# Patient Record
Sex: MALE | Race: WHITE | NOT HISPANIC OR LATINO | Employment: OTHER | URBAN - METROPOLITAN AREA
[De-identification: names, ages, dates, MRNs, and addresses within clinical notes are randomized per-mention and may not be internally consistent; named-entity substitution may affect disease eponyms.]

---

## 2017-09-21 ENCOUNTER — HISTORICAL (OUTPATIENT)
Dept: CARDIOLOGY | Facility: HOSPITAL | Age: 67
End: 2017-09-21

## 2018-03-27 ENCOUNTER — HISTORICAL (OUTPATIENT)
Dept: ADMINISTRATIVE | Facility: HOSPITAL | Age: 68
End: 2018-03-27

## 2018-03-27 LAB
ALBUMIN SERPL-MCNC: 3.6 GM/DL (ref 3.4–5)
ALBUMIN/GLOB SERPL: 1 RATIO (ref 1.1–2)
ALP SERPL-CCNC: 39 UNIT/L (ref 50–136)
ALT SERPL-CCNC: 33 UNIT/L (ref 12–78)
AST SERPL-CCNC: 27 UNIT/L (ref 15–37)
BILIRUB SERPL-MCNC: 0.6 MG/DL (ref 0.2–1)
BILIRUBIN DIRECT+TOT PNL SERPL-MCNC: 0.2 MG/DL (ref 0–0.5)
BILIRUBIN DIRECT+TOT PNL SERPL-MCNC: 0.4 MG/DL (ref 0–0.8)
BUN SERPL-MCNC: 12 MG/DL (ref 7–18)
CALCIUM SERPL-MCNC: 9.1 MG/DL (ref 8.5–10.1)
CHLORIDE SERPL-SCNC: 106 MMOL/L (ref 98–107)
CHOLEST SERPL-MCNC: 160 MG/DL (ref 0–200)
CHOLEST/HDLC SERPL: 3.1 {RATIO} (ref 0–5)
CO2 SERPL-SCNC: 26 MMOL/L (ref 21–32)
CREAT SERPL-MCNC: 0.62 MG/DL (ref 0.7–1.3)
ERYTHROCYTE [DISTWIDTH] IN BLOOD BY AUTOMATED COUNT: 13.2 % (ref 11.5–17)
GLOBULIN SER-MCNC: 3.6 GM/DL (ref 2.4–3.5)
GLUCOSE SERPL-MCNC: 78 MG/DL (ref 74–106)
HAV IGM SERPL QL IA: NEGATIVE
HBV CORE IGM SERPL QL IA: NEGATIVE
HBV SURFACE AG SERPL QL IA: NEGATIVE
HCT VFR BLD AUTO: 44.9 % (ref 42–52)
HCV AB SERPL QL IA: NEGATIVE
HDLC SERPL-MCNC: 51 MG/DL (ref 35–60)
HEPATITIS PANEL INTERP: NORMAL
HGB BLD-MCNC: 15.1 GM/DL (ref 14–18)
LDLC SERPL CALC-MCNC: 80 MG/DL (ref 0–129)
MCH RBC QN AUTO: 31.6 PG (ref 27–31)
MCHC RBC AUTO-ENTMCNC: 33.6 GM/DL (ref 33–36)
MCV RBC AUTO: 93.9 FL (ref 80–94)
PLATELET # BLD AUTO: 138 X10(3)/MCL (ref 130–400)
PMV BLD AUTO: 9.7 FL (ref 9.4–12.4)
POTASSIUM SERPL-SCNC: 4.2 MMOL/L (ref 3.5–5.1)
PROT SERPL-MCNC: 7.2 GM/DL (ref 6.4–8.2)
PSA SERPL-MCNC: 0.58 NG/ML (ref 0–4)
RBC # BLD AUTO: 4.78 X10(6)/MCL (ref 4.7–6.1)
SODIUM SERPL-SCNC: 141 MMOL/L (ref 136–145)
TRIGL SERPL-MCNC: 144 MG/DL (ref 30–150)
TSH SERPL-ACNC: 1.31 MIU/ML (ref 0.36–3.74)
VLDLC SERPL CALC-MCNC: 29 MG/DL
WBC # SPEC AUTO: 5.8 X10(3)/MCL (ref 4.5–11.5)

## 2019-02-12 ENCOUNTER — HISTORICAL (OUTPATIENT)
Dept: ADMINISTRATIVE | Facility: HOSPITAL | Age: 69
End: 2019-02-12

## 2019-09-10 ENCOUNTER — HISTORICAL (OUTPATIENT)
Dept: ADMINISTRATIVE | Facility: HOSPITAL | Age: 69
End: 2019-09-10

## 2019-09-10 LAB
ALBUMIN SERPL-MCNC: 3.8 GM/DL (ref 3.4–5)
ALBUMIN/GLOB SERPL: 1 RATIO (ref 1.1–2)
ALP SERPL-CCNC: 42 UNIT/L (ref 50–136)
ALT SERPL-CCNC: 34 UNIT/L (ref 12–78)
AST SERPL-CCNC: 26 UNIT/L (ref 15–37)
BILIRUB SERPL-MCNC: 0.8 MG/DL (ref 0.2–1)
BILIRUBIN DIRECT+TOT PNL SERPL-MCNC: 0.2 MG/DL (ref 0–0.5)
BILIRUBIN DIRECT+TOT PNL SERPL-MCNC: 0.6 MG/DL (ref 0–0.8)
BUN SERPL-MCNC: 14 MG/DL (ref 7–18)
CALCIUM SERPL-MCNC: 8.9 MG/DL (ref 8.5–10.1)
CHLORIDE SERPL-SCNC: 107 MMOL/L (ref 98–107)
CO2 SERPL-SCNC: 28 MMOL/L (ref 21–32)
CREAT SERPL-MCNC: 0.67 MG/DL (ref 0.7–1.3)
ERYTHROCYTE [DISTWIDTH] IN BLOOD BY AUTOMATED COUNT: 13 % (ref 11.5–17)
EST. AVERAGE GLUCOSE BLD GHB EST-MCNC: 111 MG/DL
GLOBULIN SER-MCNC: 3.7 GM/DL (ref 2.4–3.5)
GLUCOSE SERPL-MCNC: 99 MG/DL (ref 74–106)
HBA1C MFR BLD: 5.5 % (ref 4.2–6.3)
HCT VFR BLD AUTO: 48.2 % (ref 42–52)
HGB BLD-MCNC: 15.7 GM/DL (ref 14–18)
MCH RBC QN AUTO: 31.8 PG (ref 27–31)
MCHC RBC AUTO-ENTMCNC: 32.6 GM/DL (ref 33–36)
MCV RBC AUTO: 97.6 FL (ref 80–94)
PLATELET # BLD AUTO: 187 X10(3)/MCL (ref 130–400)
PMV BLD AUTO: 10.1 FL (ref 9.4–12.4)
POTASSIUM SERPL-SCNC: 4.2 MMOL/L (ref 3.5–5.1)
PROT SERPL-MCNC: 7.5 GM/DL (ref 6.4–8.2)
PSA SERPL-MCNC: 0.81 NG/ML (ref 0–4)
RBC # BLD AUTO: 4.94 X10(6)/MCL (ref 4.7–6.1)
SODIUM SERPL-SCNC: 142 MMOL/L (ref 136–145)
URATE SERPL-MCNC: 4.1 MG/DL (ref 2.6–7.2)
WBC # SPEC AUTO: 7.4 X10(3)/MCL (ref 4.5–11.5)

## 2020-01-27 ENCOUNTER — HISTORICAL (OUTPATIENT)
Dept: PREADMISSION TESTING | Facility: HOSPITAL | Age: 70
End: 2020-01-27

## 2020-01-27 LAB
ABS NEUT (OLG): 3.42 X10(3)/MCL (ref 2.1–9.2)
BASOPHILS # BLD AUTO: 0 X10(3)/MCL (ref 0–0.2)
BASOPHILS NFR BLD AUTO: 1 %
BUN SERPL-MCNC: 11 MG/DL (ref 7–18)
CALCIUM SERPL-MCNC: 9.4 MG/DL (ref 8.5–10.1)
CHLORIDE SERPL-SCNC: 105 MMOL/L (ref 98–107)
CO2 SERPL-SCNC: 29 MMOL/L (ref 21–32)
CREAT SERPL-MCNC: 0.7 MG/DL (ref 0.7–1.3)
CREAT/UREA NIT SERPL: 15.7
EOSINOPHIL # BLD AUTO: 0.1 X10(3)/MCL (ref 0–0.9)
EOSINOPHIL NFR BLD AUTO: 2 %
ERYTHROCYTE [DISTWIDTH] IN BLOOD BY AUTOMATED COUNT: 12.6 % (ref 11.5–17)
GLUCOSE SERPL-MCNC: 97 MG/DL (ref 74–106)
HCT VFR BLD AUTO: 48 % (ref 42–52)
HGB BLD-MCNC: 15.9 GM/DL (ref 14–18)
LYMPHOCYTES # BLD AUTO: 2 X10(3)/MCL (ref 0.6–4.6)
LYMPHOCYTES NFR BLD AUTO: 32 %
MCH RBC QN AUTO: 32.4 PG (ref 27–31)
MCHC RBC AUTO-ENTMCNC: 33.1 GM/DL (ref 33–36)
MCV RBC AUTO: 97.8 FL (ref 80–94)
MONOCYTES # BLD AUTO: 0.8 X10(3)/MCL (ref 0.1–1.3)
MONOCYTES NFR BLD AUTO: 13 %
NEUTROPHILS # BLD AUTO: 3.42 X10(3)/MCL (ref 2.1–9.2)
NEUTROPHILS NFR BLD AUTO: 53 %
PLATELET # BLD AUTO: 152 X10(3)/MCL (ref 130–400)
PMV BLD AUTO: 10.1 FL (ref 9.4–12.4)
POTASSIUM SERPL-SCNC: 4.4 MMOL/L (ref 3.5–5.1)
RBC # BLD AUTO: 4.91 X10(6)/MCL (ref 4.7–6.1)
SODIUM SERPL-SCNC: 139 MMOL/L (ref 136–145)
WBC # SPEC AUTO: 6.5 X10(3)/MCL (ref 4.5–11.5)

## 2020-02-06 ENCOUNTER — HISTORICAL (OUTPATIENT)
Dept: SURGERY | Facility: HOSPITAL | Age: 70
End: 2020-02-06

## 2021-01-28 ENCOUNTER — HISTORICAL (OUTPATIENT)
Dept: PREADMISSION TESTING | Facility: HOSPITAL | Age: 71
End: 2021-01-28

## 2021-01-28 LAB
ABS NEUT (OLG): 2.92 X10(3)/MCL (ref 2.1–9.2)
BASOPHILS # BLD AUTO: 0.1 X10(3)/MCL (ref 0–0.2)
BASOPHILS NFR BLD AUTO: 1 %
EOSINOPHIL # BLD AUTO: 0.2 X10(3)/MCL (ref 0–0.9)
EOSINOPHIL NFR BLD AUTO: 2 %
ERYTHROCYTE [DISTWIDTH] IN BLOOD BY AUTOMATED COUNT: 13.2 % (ref 11.5–17)
HCT VFR BLD AUTO: 48 % (ref 42–52)
HGB BLD-MCNC: 15.8 GM/DL (ref 14–18)
LYMPHOCYTES # BLD AUTO: 2.4 X10(3)/MCL (ref 0.6–4.6)
LYMPHOCYTES NFR BLD AUTO: 38 %
MCH RBC QN AUTO: 32.4 PG (ref 27–31)
MCHC RBC AUTO-ENTMCNC: 32.9 GM/DL (ref 33–36)
MCV RBC AUTO: 98.6 FL (ref 80–94)
MONOCYTES # BLD AUTO: 0.8 X10(3)/MCL (ref 0.1–1.3)
MONOCYTES NFR BLD AUTO: 12 %
NEUTROPHILS # BLD AUTO: 2.92 X10(3)/MCL (ref 2.1–9.2)
NEUTROPHILS NFR BLD AUTO: 46 %
PLATELET # BLD AUTO: 176 X10(3)/MCL (ref 130–400)
PMV BLD AUTO: 10.4 FL (ref 9.4–12.4)
RBC # BLD AUTO: 4.87 X10(6)/MCL (ref 4.7–6.1)
WBC # SPEC AUTO: 6.3 X10(3)/MCL (ref 4.5–11.5)

## 2021-02-01 ENCOUNTER — HISTORICAL (OUTPATIENT)
Dept: ADMINISTRATIVE | Facility: HOSPITAL | Age: 71
End: 2021-02-01

## 2021-09-10 ENCOUNTER — HISTORICAL (OUTPATIENT)
Dept: LAB | Facility: HOSPITAL | Age: 71
End: 2021-09-10

## 2021-09-10 LAB
ABS NEUT (OLG): 2.01 X10(3)/MCL (ref 2.1–9.2)
ALBUMIN SERPL-MCNC: 3.7 GM/DL (ref 3.4–4.8)
ALBUMIN/GLOB SERPL: 1 RATIO (ref 1.1–2)
ALP SERPL-CCNC: 41 UNIT/L (ref 40–150)
ALT SERPL-CCNC: 41 UNIT/L (ref 0–55)
AST SERPL-CCNC: 35 UNIT/L (ref 5–34)
BASOPHILS # BLD AUTO: 0.06 X10(3)/MCL (ref 0–0.2)
BASOPHILS NFR BLD AUTO: 1.2 % (ref 0–0.9)
BILIRUB SERPL-MCNC: 0.5 MG/DL (ref 0.2–1.2)
BILIRUBIN DIRECT+TOT PNL SERPL-MCNC: 0.2 MG/DL (ref 0–0.5)
BILIRUBIN DIRECT+TOT PNL SERPL-MCNC: 0.3 MG/DL (ref 0–0.8)
BUN SERPL-MCNC: 9.6 MG/DL (ref 8.4–25.7)
CALCIUM SERPL-MCNC: 9.2 MG/DL (ref 8.8–10)
CHLORIDE SERPL-SCNC: 107 MMOL/L (ref 98–107)
CHOLEST SERPL-MCNC: 147 MG/DL
CHOLEST/HDLC SERPL: 3 {RATIO} (ref 0–5)
CO2 SERPL-SCNC: 25 MMOL/L (ref 23–31)
CREAT SERPL-MCNC: 0.69 MG/DL (ref 0.72–1.25)
EOSINOPHIL # BLD AUTO: 0.17 X10(3)/MCL (ref 0–0.9)
EOSINOPHIL NFR BLD AUTO: 3.4 % (ref 0–6.5)
ERYTHROCYTE [DISTWIDTH] IN BLOOD BY AUTOMATED COUNT: 12.9 % (ref 11.5–17)
EST. AVERAGE GLUCOSE BLD GHB EST-MCNC: 108.3 MG/DL
GLOBULIN SER-MCNC: 3.8 GM/DL (ref 2.4–3.5)
GLUCOSE SERPL-MCNC: 107 MG/DL (ref 82–115)
HBA1C MFR BLD: 5.4 %
HCT VFR BLD AUTO: 47.2 % (ref 42–52)
HDLC SERPL-MCNC: 45 MG/DL (ref 40–60)
HGB BLD-MCNC: 15.8 GM/DL (ref 14–18)
IMM GRANULOCYTES # BLD AUTO: 0.01 10*3/UL (ref 0–0.02)
IMM GRANULOCYTES NFR BLD AUTO: 0.2 % (ref 0–0.43)
LDLC SERPL CALC-MCNC: 72 MG/DL (ref 50–140)
LYMPHOCYTES # BLD AUTO: 2.22 X10(3)/MCL (ref 0.6–4.6)
LYMPHOCYTES NFR BLD AUTO: 43.9 % (ref 16.2–38.3)
MCH RBC QN AUTO: 32.6 PG (ref 27–31)
MCHC RBC AUTO-ENTMCNC: 33.5 GM/DL (ref 33–36)
MCV RBC AUTO: 97.5 FL (ref 80–94)
MONOCYTES # BLD AUTO: 0.59 X10(3)/MCL (ref 0.1–1.3)
MONOCYTES NFR BLD AUTO: 11.7 % (ref 4.7–11.3)
NEUTROPHILS # BLD AUTO: 2.01 X10(3)/MCL (ref 2.1–9.2)
NEUTROPHILS NFR BLD AUTO: 39.6 % (ref 49.1–73.4)
NRBC BLD AUTO-RTO: 0 % (ref 0–0.2)
PLATELET # BLD AUTO: 161 X10(3)/MCL (ref 130–400)
PMV BLD AUTO: 10 FL (ref 7.4–10.4)
POTASSIUM SERPL-SCNC: 4.1 MMOL/L (ref 3.5–5.1)
PROT SERPL-MCNC: 7.5 GM/DL (ref 5.8–7.6)
PSA SERPL-MCNC: 0.89 NG/ML
RBC # BLD AUTO: 4.84 X10(6)/MCL (ref 4.7–6.1)
SODIUM SERPL-SCNC: 142 MMOL/L (ref 136–145)
TRIGL SERPL-MCNC: 148 MG/DL (ref 0–150)
URATE SERPL-MCNC: 4 MG/DL (ref 3.5–7.2)
VLDLC SERPL CALC-MCNC: 30 MG/DL
WBC # SPEC AUTO: 5.1 X10(3)/MCL (ref 4.5–11.5)

## 2022-03-08 ENCOUNTER — HISTORICAL (OUTPATIENT)
Dept: LAB | Facility: HOSPITAL | Age: 72
End: 2022-03-08

## 2022-03-08 LAB
ABS NEUT (OLG): 2.86 (ref 2.1–9.2)
ALBUMIN SERPL-MCNC: 3.8 G/DL (ref 3.4–4.8)
ALBUMIN/GLOB SERPL: 1 {RATIO} (ref 1.1–2)
ALP SERPL-CCNC: 44 U/L (ref 40–150)
ALT SERPL-CCNC: 31 U/L (ref 0–55)
AST SERPL-CCNC: 29 U/L (ref 5–34)
BASOPHILS # BLD AUTO: 0.07 10*3/UL (ref 0–0.2)
BASOPHILS NFR BLD AUTO: 1.2 % (ref 0–0.9)
BILIRUB SERPL-MCNC: 0.7 MG/DL (ref 0.2–1.2)
BILIRUBIN DIRECT+TOT PNL SERPL-MCNC: 0.3 (ref 0–0.5)
BILIRUBIN DIRECT+TOT PNL SERPL-MCNC: 0.4 (ref 0–0.8)
BUN SERPL-MCNC: 10.3 MG/DL (ref 8.4–25.7)
CALCIUM SERPL-MCNC: 9.3 MG/DL (ref 8.8–10)
CHLORIDE SERPL-SCNC: 104 MMOL/L (ref 98–107)
CHOLEST SERPL-MCNC: 167 MG/DL
CHOLEST/HDLC SERPL: 3 {RATIO} (ref 0–5)
CO2 SERPL-SCNC: 26 MMOL/L (ref 23–31)
CREAT SERPL-MCNC: 0.72 MG/DL (ref 0.72–1.25)
EOSINOPHIL # BLD AUTO: 0.16 10*3/UL (ref 0–0.9)
EOSINOPHIL NFR BLD AUTO: 2.7 % (ref 0–6.5)
ERYTHROCYTE [DISTWIDTH] IN BLOOD BY AUTOMATED COUNT: 12.9 % (ref 11.5–17)
EST. AVERAGE GLUCOSE BLD GHB EST-MCNC: 114 MG/DL
GLOBULIN SER-MCNC: 4 G/DL (ref 2.4–3.5)
GLUCOSE SERPL-MCNC: 113 MG/DL (ref 82–115)
HBA1C MFR BLD: 5.6 %
HCT VFR BLD AUTO: 46.5 % (ref 42–52)
HDLC SERPL-MCNC: 56 MG/DL (ref 40–60)
HEMOLYSIS INTERF INDEX SERPL-ACNC: 6
HGB BLD-MCNC: 15.7 G/DL (ref 14–18)
ICTERIC INTERF INDEX SERPL-ACNC: 1
IMM GRANULOCYTES # BLD AUTO: 0.02 10*3/UL (ref 0–0.02)
IMM GRANULOCYTES NFR BLD AUTO: 0.3 % (ref 0–0.43)
LDLC SERPL CALC-MCNC: 87 MG/DL (ref 50–140)
LIPEMIC INTERF INDEX SERPL-ACNC: 3
LYMPHOCYTES # BLD AUTO: 2.16 10*3/UL (ref 0.6–4.6)
LYMPHOCYTES NFR BLD AUTO: 36.7 % (ref 16.2–38.3)
MANUAL DIFF? (OHS): NO
MCH RBC QN AUTO: 32.8 PG (ref 27–31)
MCHC RBC AUTO-ENTMCNC: 33.8 G/DL (ref 33–36)
MCV RBC AUTO: 97.3 FL (ref 80–94)
MONOCYTES # BLD AUTO: 0.62 10*3/UL (ref 0.1–1.3)
MONOCYTES NFR BLD AUTO: 10.5 % (ref 4.7–11.3)
NEUTROPHILS # BLD AUTO: 2.86 10*3/UL (ref 2.1–9.2)
NEUTROPHILS NFR BLD AUTO: 48.6 % (ref 49.1–73.4)
NRBC BLD AUTO-RTO: 0 % (ref 0–0.2)
PLATELET # BLD AUTO: 151 10*3/UL (ref 130–400)
PMV BLD AUTO: 9.8 FL (ref 7.4–10.4)
POTASSIUM SERPL-SCNC: 4.2 MMOL/L (ref 3.5–5.1)
PROT SERPL-MCNC: 7.8 G/DL (ref 5.8–7.6)
RBC # BLD AUTO: 4.78 10*6/UL (ref 4.7–6.1)
SODIUM SERPL-SCNC: 139 MMOL/L (ref 136–145)
TRIGL SERPL-MCNC: 122 MG/DL (ref 0–150)
URATE SERPL-MCNC: 4.1 MG/DL (ref 3.5–7.2)
VLDLC SERPL CALC-MCNC: 24 MG/DL
WBC # SPEC AUTO: 5.9 10*3/UL (ref 4.5–11.5)

## 2022-04-10 ENCOUNTER — HISTORICAL (OUTPATIENT)
Dept: ADMINISTRATIVE | Facility: HOSPITAL | Age: 72
End: 2022-04-10

## 2022-04-25 VITALS
BODY MASS INDEX: 33.97 KG/M2 | WEIGHT: 279 LBS | HEIGHT: 76 IN | DIASTOLIC BLOOD PRESSURE: 80 MMHG | OXYGEN SATURATION: 97 % | SYSTOLIC BLOOD PRESSURE: 134 MMHG

## 2022-04-30 NOTE — OP NOTE
DATE OF SURGERY:    02/01/2021    SURGEON:  Scot Ball MD    PREOPERATIVE DIAGNOSIS:  Fistula-in-ano.    POSTOPERATIVE DIAGNOSIS:  Fistula-in-ano.    PROCEDURE PERFORMED:  Intersphincteric fistulotomy.    ANESTHESIA:  General.    ESTIMATED BLOOD LOSS:  10 cc.    SPECIMENS:  None.    COMPLICATIONS:  None.    PROCEDURE IN DETAIL:  After informed consent was obtained, the patient was brought to the operating room.  General endotracheal anesthesia was administered by a member of the anesthesia team.  The patient was placed in the prone jackknife position.  Buttocks were taped apart for exposure.  The perianal skin was prepped and draped in sterile surgical fashion.  A medium Hill-Sheppard retractor was inserted and all 4 quadrants inspected.  The patient had no obvious anorectal pathology.  In the right and left anterolateral perianal skin were 2 external openings.  A lacrimal probe was placed at the left anterolateral opening and easily communicated with an internal opening; thus, delineating a fistula tract.  A second lacrimal probe was placed at the right anterior lateral opening, which was somewhat deeper, and also connected with the same internal opening; thus, the patient had a V-shaped fistula.  Both tracts were then opened with electrocautery; thus, performing an intersphincteric fistulotomy.  The edges were then marsupialized with running locking 3-0 Vicryl suture.  The apex was reinforced with a figure-of-eight 2-0 Vicryl suture.  Marcaine 0.25% with epinephrine was used for local anesthesia.  A hydrocortisone suppository was placed within the rectum.  Dry gauze was placed in the fistulotomy wounds then covered with dry gauze secured with tape.  The patient tolerated the procedure well, and there were no     complications.  He was returned to the supine position.  He was awakened and extubated in the operating room then subsequently transferred to recovery in satisfactory  condition.        ______________________________  MD DINO Dennis  DD:  02/01/2021  Time:  10:20AM  DT:  02/01/2021  Time:  10:33AM  Job #:  [id number]    cc: Omar Donahue MD

## 2022-04-30 NOTE — OP NOTE
Patient:   Adam Jean Baptiste            MRN: [id number]            FIN: [id number]               Age:   69 years     Sex:  Male     :  1950   Associated Diagnoses:   None   Author:   Pranav Sim MD        DATE OF SURGERY: 20    SURGEON:  Pranav Sim MD    PREOPERATIVE DIAGNOSIS:  Left Inguinal Hernia    POST OPERATIVE DIAGNOSIS:  Same.    PROCEDURE:  Open Left Inguinal Hernia    ANESTHESIA:  General endotracheal anesthesia.    ESTIMATED BLOOD LOSS:  Approximately 20 cc.   Blood administered, none.    LAP AND INSTRUMENT COUNT:  Correct X2 at the end of the case.    SPECIMENS:  cord lipoma    INDICATION AND SIGNIFICANT HISTORY:  Patient is a 69-year-old male complaining of bulge in his left groin for the past few weeks.  Patient was worked up clinically and found to have a left inguinal hernia.  Risks and benefits of surgery was discussed with the patient who voiced understanding of risks / benefits and elected to proceed with surgery.        PROCEDURE IN DETAIL:  Once informed consents were obtained, the patient was taken to the OR and placed supine on the OR table.  After general endotracheal anesthesia was induced the abdomen was prepped and draped in the standard surgical fashion.  An incision was made in line with the inguinal ligament in the left lower quadrant.  Dissection was carried out through Marycarmen's fascia to the level of the external oblique aponeurosis.  This was then opened in the line of its fibers sharply and retracted laterally.  The cord and cord structures were dissected off the pelvic floor and wrapped with a Penrose drain for retraction.  The ilioinguinal nerve was dissected off the cord and retracted out of the operative area.  Attention was then redirected to the anterior medial portion of the sac which was dissected and the hernia sac was found.  The patient had a large inguinal hernia sac with chronic scarring to all surrounding tissue.  Care was taken to slowly  and easily dissect all the surrounding structures including the vas deferens off the hernia sac from the cord.  The hernia sac was then opened and no abdominal contents were visualized.  The hernia defect was then closed and high ligated with a 2-0 silk suture followed by a silk simple tie.  A cord lipoma was then dissected off the cord and passed off the table as a specimen.  The wound was then copiously irrigated and dried.  Due to a moderate degree of laxity in the pelvic floor, a piece of mesh was placed, secured to the pubic tubercle and inguinal ligament laterally, and shelving border medially.  External oblique aponeurosis was closed with a 2-0 Vicryl suture in a running fashion.  Marycarmen's fascia was closed with 3-0 Vicryl suture in a running subcuticular fashion.  The skin was then closed with a 4-0 Vicryl suture in a subcuticular fashion.  The skin was clean and dry and a dry sterile dressing was placed on the wound.  Lap and instrument counts were correct times two at the end of the case.  The patient tolerated the procedure well and was transported to the Recovery Room in good condition.

## 2022-05-02 ENCOUNTER — TELEPHONE (OUTPATIENT)
Dept: INTERNAL MEDICINE | Facility: CLINIC | Age: 72
End: 2022-05-02
Payer: MEDICARE

## 2022-05-02 ENCOUNTER — HOSPITAL ENCOUNTER (EMERGENCY)
Facility: HOSPITAL | Age: 72
Discharge: HOME | End: 2022-05-02
Attending: EMERGENCY MEDICINE
Payer: MEDICARE

## 2022-05-02 VITALS
DIASTOLIC BLOOD PRESSURE: 86 MMHG | RESPIRATION RATE: 20 BRPM | BODY MASS INDEX: 32.26 KG/M2 | TEMPERATURE: 97 F | SYSTOLIC BLOOD PRESSURE: 129 MMHG | OXYGEN SATURATION: 97 % | WEIGHT: 265 LBS | HEART RATE: 88 BPM

## 2022-05-02 DIAGNOSIS — J20.9 ACUTE BRONCHITIS, UNSPECIFIED ORGANISM: Primary | ICD-10-CM

## 2022-05-02 DIAGNOSIS — R07.9 CHEST PAIN: ICD-10-CM

## 2022-05-02 LAB
ALBUMIN SERPL-MCNC: 3.9 GM/DL (ref 3.4–4.8)
ALBUMIN/GLOB SERPL: 1.1 RATIO (ref 1.1–2)
ALP SERPL-CCNC: 45 UNIT/L (ref 40–150)
ALT SERPL-CCNC: 29 UNIT/L (ref 0–55)
AST SERPL-CCNC: 31 UNIT/L (ref 5–34)
BASOPHILS # BLD AUTO: 0.05 X10(3)/MCL (ref 0–0.2)
BASOPHILS NFR BLD AUTO: 0.5 %
BILIRUBIN DIRECT+TOT PNL SERPL-MCNC: 0.3 MG/DL (ref 0–0.5)
BILIRUBIN DIRECT+TOT PNL SERPL-MCNC: 0.4 MG/DL (ref 0–0.8)
BILIRUBIN DIRECT+TOT PNL SERPL-MCNC: 0.7 MG/DL
BNP BLD-MCNC: 132.6 PG/ML
BUN SERPL-MCNC: 8.4 MG/DL (ref 8.4–25.7)
CALCIUM SERPL-MCNC: 9.2 MG/DL (ref 8.8–10)
CHLORIDE SERPL-SCNC: 105 MMOL/L (ref 98–107)
CO2 SERPL-SCNC: 26 MMOL/L (ref 23–31)
CREAT SERPL-MCNC: 0.66 MG/DL (ref 0.73–1.18)
EOSINOPHIL # BLD AUTO: 0.12 X10(3)/MCL (ref 0–0.9)
EOSINOPHIL NFR BLD AUTO: 1.2 %
ERYTHROCYTE [DISTWIDTH] IN BLOOD BY AUTOMATED COUNT: 13.2 % (ref 11.5–17)
GLOBULIN SER-MCNC: 3.5 GM/DL (ref 2.4–3.5)
GLUCOSE SERPL-MCNC: 109 MG/DL (ref 82–115)
HCT VFR BLD AUTO: 44.2 % (ref 42–52)
HGB BLD-MCNC: 15 GM/DL (ref 14–18)
IMM GRANULOCYTES # BLD AUTO: 0.04 X10(3)/MCL (ref 0–0.02)
IMM GRANULOCYTES NFR BLD AUTO: 0.4 % (ref 0–0.43)
LYMPHOCYTES # BLD AUTO: 1.85 X10(3)/MCL (ref 0.6–4.6)
LYMPHOCYTES NFR BLD AUTO: 18.8 %
MCH RBC QN AUTO: 33.1 PG (ref 27–31)
MCHC RBC AUTO-ENTMCNC: 33.9 MG/DL (ref 33–36)
MCV RBC AUTO: 97.6 FL (ref 80–94)
MONOCYTES # BLD AUTO: 0.99 X10(3)/MCL (ref 0.1–1.3)
MONOCYTES NFR BLD AUTO: 10.1 %
NEUTROPHILS # BLD AUTO: 6.8 X10(3)/MCL (ref 2.1–9.2)
NEUTROPHILS NFR BLD AUTO: 69 %
NRBC BLD AUTO-RTO: 0 %
PLATELET # BLD AUTO: 179 X10(3)/MCL (ref 130–400)
PMV BLD AUTO: 10 FL (ref 9.4–12.4)
POTASSIUM SERPL-SCNC: 4.2 MMOL/L (ref 3.5–5.1)
PROT SERPL-MCNC: 7.4 GM/DL (ref 5.8–7.6)
RBC # BLD AUTO: 4.53 X10(6)/MCL (ref 4.7–6.1)
SARS-COV-2 RNA RESP QL NAA+PROBE: NOT DETECTED
SODIUM SERPL-SCNC: 141 MMOL/L (ref 136–145)
TROPONIN I SERPL-MCNC: <0.01 NG/ML (ref 0–0.04)
WBC # SPEC AUTO: 9.9 X10(3)/MCL (ref 4.5–11.5)

## 2022-05-02 PROCEDURE — 85025 COMPLETE CBC W/AUTO DIFF WBC: CPT | Performed by: EMERGENCY MEDICINE

## 2022-05-02 PROCEDURE — 87635 SARS-COV-2 COVID-19 AMP PRB: CPT | Performed by: EMERGENCY MEDICINE

## 2022-05-02 PROCEDURE — 63600175 PHARM REV CODE 636 W HCPCS: Performed by: EMERGENCY MEDICINE

## 2022-05-02 PROCEDURE — 93010 ELECTROCARDIOGRAM REPORT: CPT | Mod: ,,, | Performed by: INTERNAL MEDICINE

## 2022-05-02 PROCEDURE — 84484 ASSAY OF TROPONIN QUANT: CPT | Performed by: EMERGENCY MEDICINE

## 2022-05-02 PROCEDURE — 96372 THER/PROPH/DIAG INJ SC/IM: CPT | Performed by: EMERGENCY MEDICINE

## 2022-05-02 PROCEDURE — 83880 ASSAY OF NATRIURETIC PEPTIDE: CPT | Performed by: EMERGENCY MEDICINE

## 2022-05-02 PROCEDURE — 99285 EMERGENCY DEPT VISIT HI MDM: CPT | Mod: 25

## 2022-05-02 PROCEDURE — 93005 ELECTROCARDIOGRAM TRACING: CPT

## 2022-05-02 PROCEDURE — 94640 AIRWAY INHALATION TREATMENT: CPT

## 2022-05-02 PROCEDURE — 36415 COLL VENOUS BLD VENIPUNCTURE: CPT | Performed by: EMERGENCY MEDICINE

## 2022-05-02 PROCEDURE — 80053 COMPREHEN METABOLIC PANEL: CPT | Performed by: EMERGENCY MEDICINE

## 2022-05-02 PROCEDURE — 25000242 PHARM REV CODE 250 ALT 637 W/ HCPCS: Performed by: EMERGENCY MEDICINE

## 2022-05-02 PROCEDURE — 93010 EKG 12-LEAD: ICD-10-PCS | Mod: ,,, | Performed by: INTERNAL MEDICINE

## 2022-05-02 RX ORDER — METOPROLOL TARTRATE 25 MG/1
25 TABLET, FILM COATED ORAL DAILY
COMMUNITY
End: 2023-03-21

## 2022-05-02 RX ORDER — BETAMETHASONE SODIUM PHOSPHATE AND BETAMETHASONE ACETATE 3; 3 MG/ML; MG/ML
INJECTION, SUSPENSION INTRA-ARTICULAR; INTRALESIONAL; INTRAMUSCULAR; SOFT TISSUE
Status: DISCONTINUED
Start: 2022-05-02 | End: 2022-05-02 | Stop reason: HOSPADM

## 2022-05-02 RX ORDER — IPRATROPIUM BROMIDE AND ALBUTEROL SULFATE 2.5; .5 MG/3ML; MG/3ML
3 SOLUTION RESPIRATORY (INHALATION)
Status: DISPENSED | OUTPATIENT
Start: 2022-05-02 | End: 2022-05-02

## 2022-05-02 RX ORDER — ALLOPURINOL 300 MG/1
300 TABLET ORAL DAILY
COMMUNITY
End: 2022-07-01

## 2022-05-02 RX ORDER — UBIDECARENONE 30 MG
30 CAPSULE ORAL 3 TIMES DAILY
COMMUNITY

## 2022-05-02 RX ORDER — ATORVASTATIN CALCIUM 20 MG/1
20 TABLET, FILM COATED ORAL DAILY
COMMUNITY
End: 2022-07-11 | Stop reason: SDUPTHER

## 2022-05-02 RX ORDER — BETAMETHASONE SODIUM PHOSPHATE AND BETAMETHASONE ACETATE 3; 3 MG/ML; MG/ML
12 INJECTION, SUSPENSION INTRA-ARTICULAR; INTRALESIONAL; INTRAMUSCULAR; SOFT TISSUE
Status: COMPLETED | OUTPATIENT
Start: 2022-05-02 | End: 2022-05-02

## 2022-05-02 RX ORDER — ASPIRIN 325 MG
325 TABLET ORAL
Status: DISCONTINUED | OUTPATIENT
Start: 2022-05-02 | End: 2022-05-02

## 2022-05-02 RX ORDER — ZINC GLUCONATE 50 MG
50 TABLET ORAL DAILY
COMMUNITY

## 2022-05-02 RX ORDER — IPRATROPIUM BROMIDE AND ALBUTEROL SULFATE 2.5; .5 MG/3ML; MG/3ML
3 SOLUTION RESPIRATORY (INHALATION) EVERY 6 HOURS PRN
Qty: 75 ML | Refills: 0 | Status: SHIPPED | OUTPATIENT
Start: 2022-05-02 | End: 2023-03-21

## 2022-05-02 RX ADMIN — IPRATROPIUM BROMIDE AND ALBUTEROL SULFATE 3 ML: 2.5; .5 SOLUTION RESPIRATORY (INHALATION) at 12:05

## 2022-05-02 RX ADMIN — BETAMETHASONE SODIUM PHOSPHATE AND BETAMETHASONE ACETATE 12 MG: 3; 3 INJECTION, SUSPENSION INTRA-ARTICULAR; INTRALESIONAL; INTRAMUSCULAR at 01:05

## 2022-05-02 NOTE — ED PROVIDER NOTES
Encounter Date: 5/2/2022       History     Chief Complaint   Patient presents with    Cough     Chest congestion with orthopnea x 5 weeks. States having to sleep sitting up     71-year-old  gentleman with a history of atrial fibrillation on Xarelto, hyperlipidemia, complains of cough for the last 6 weeks.  He states he went to an urgent care when it 1st started and did a course of antibiotics and multiple symptomatic medications and seemed to improve.  However, over the last 2 weeks he has been coughing again, productive of a lot of sputum, feeling chest congestion.  He has no chest pain or shortness of breath.  He says he can not lie flat due to shortness of breath and cough.  He has some leg swelling but states this is chronic and at baseline.  He has no leg pain, no history of DVT or PE      Cough  This is a new problem. Illness onset: Six week. The problem has been waxing and waning. The cough is productive of sputum. There has been no fever. Pertinent negatives include no chest pain, no chills and no sweats. Treatments tried: albuterol. He is not a smoker. Past medical history comments: Afib.     Review of patient's allergies indicates:  No Known Allergies  No past medical history on file.  Past Surgical History:   Procedure Laterality Date    APPENDECTOMY      TONSILLECTOMY       No family history on file.     Review of Systems   Constitutional: Negative for chills.   Respiratory: Positive for cough.         Orthopnea   Cardiovascular: Negative for chest pain.   All other systems reviewed and are negative.      Physical Exam     Initial Vitals [05/02/22 1021]   BP Pulse Resp Temp SpO2   131/82 76 20 97.2 °F (36.2 °C) (!) 94 %      MAP       --         Physical Exam    Nursing note and vitals reviewed.  Constitutional: Vital signs are normal. He appears well-developed and well-nourished.   HENT:   Head: Normocephalic and atraumatic.   Eyes: Pupils are equal, round, and reactive to light.   Neck:  Neck supple.   Cardiovascular: Normal rate, regular rhythm and normal heart sounds.   Pulmonary/Chest: He has rhonchi.   Rhonchi bilaterally with no respiratory distress or increased work of breathing   Musculoskeletal:      Cervical back: Neck supple. No edema or erythema.     Neurological: He is alert.   Skin: Skin is warm and dry.         ED Course   Procedures  Labs Reviewed   COMPREHENSIVE METABOLIC PANEL - Abnormal; Notable for the following components:       Result Value    Creatinine 0.66 (*)     All other components within normal limits   B-TYPE NATRIURETIC PEPTIDE - Abnormal; Notable for the following components:    Natriuretic Peptide 132.6 (*)     All other components within normal limits   CBC WITH DIFFERENTIAL - Abnormal; Notable for the following components:    RBC 4.53 (*)     MCV 97.6 (*)     MCH 33.1 (*)     IG# 0.04 (*)     All other components within normal limits   TROPONIN I - Normal   SARS-COV-2 (COVID-19) QUALITATIVE PCR - Normal   CBC W/ AUTO DIFFERENTIAL    Narrative:     The following orders were created for panel order CBC auto differential.  Procedure                               Abnormality         Status                     ---------                               -----------         ------                     CBC with Differential[[id number]]        Abnormal            Final result                 Please view results for these tests on the individual orders.     Labs Reviewed   COMPREHENSIVE METABOLIC PANEL - Abnormal; Notable for the following components:       Result Value    Creatinine 0.66 (*)     All other components within normal limits   B-TYPE NATRIURETIC PEPTIDE - Abnormal; Notable for the following components:    Natriuretic Peptide 132.6 (*)     All other components within normal limits   CBC WITH DIFFERENTIAL - Abnormal; Notable for the following components:    RBC 4.53 (*)     MCV 97.6 (*)     MCH 33.1 (*)     IG# 0.04 (*)     All other components within normal limits    TROPONIN I - Normal   SARS-COV-2 (COVID-19) QUALITATIVE PCR - Normal   CBC W/ AUTO DIFFERENTIAL    Narrative:     The following orders were created for panel order CBC auto differential.  Procedure                               Abnormality         Status                     ---------                               -----------         ------                     CBC with Differential[[id number]]        Abnormal            Final result                 Please view results for these tests on the individual orders.     EKG Readings: (Independently Interpreted)   Ectopy: No Ectopy.   Atrial fibrillation, rate 77, low-voltage QRS, normal ST and T waves,        Imaging Results          X-Ray Chest PA And Lateral (Final result)  Result time 05/02/22 11:29:11    Final result by Pedrito Benson MD (05/02/22 11:29:11)                 Impression:      No acute cardiopulmonary abnormality.      Electronically signed by: Pedrito Benson  Date:    05/02/2022  Time:    11:29             Narrative:    EXAMINATION:  XR CHEST PA AND LATERAL    CLINICAL HISTORY:  Cough;    COMPARISON:  20 May 2021    FINDINGS:  PA and lateral views of the chest were obtained. The heart is not enlarged.  There is aortic atherosclerosis.  There are mild chronic changes of the lungs.  No pneumothorax or significant effusion.                                 Medications   albuterol-ipratropium 2.5 mg-0.5 mg/3 mL nebulizer solution 3 mL (3 mLs Nebulization Given 5/2/22 1213)         1:09 PM  Patient was given a breathing treatment states he is feeling much better at this time.  He still has some congestion on lung exam but he has good air movement no increased work of breathing.  He is stable for discharge to follow-up with his primary care provider and cardiologist                 Clinical Impression:   Final diagnoses:  [R07.9] Chest pain  [J20.9] Acute bronchitis, unspecified organism (Primary)          ED Disposition Condition    Discharge Stable        ED  Prescriptions     Medication Sig Dispense Start Date End Date Auth. Provider    albuterol-ipratropium (DUO-NEB) 2.5 mg-0.5 mg/3 mL nebulizer solution Take 3 mLs by nebulization every 6 (six) hours as needed for Wheezing. Rescue 75 mL 5/2/2022 5/2/2023 Verenice Dailey MD        Follow-up Information    None          Verenice Dailey MD  05/02/22 3598

## 2022-05-02 NOTE — ED NOTES
C/p sinus congestion for past few weeks. Now white prod. Cough and unable to lie flat.sob on exertion

## 2022-05-02 NOTE — DISCHARGE INSTRUCTIONS
Follow-up with your primary care provider and Cardiologist.  You can take Mucinex over-the-counter for cough and use the DuoNebs as prescribed.

## 2022-05-10 NOTE — TELEPHONE ENCOUNTER
"Pt called back stating "I completed all my Ax and Treatment still wheezing and spitting up dark mucus".    Any further recommendations  "

## 2022-05-11 RX ORDER — AZITHROMYCIN 250 MG/1
TABLET, FILM COATED ORAL
Qty: 6 TABLET | Refills: 0 | Status: SHIPPED | OUTPATIENT
Start: 2022-05-11 | End: 2022-05-16

## 2022-09-12 DIAGNOSIS — Z00.00 WELLNESS EXAMINATION: Primary | ICD-10-CM

## 2022-09-12 DIAGNOSIS — I10 HYPERTENSION, UNSPECIFIED TYPE: ICD-10-CM

## 2022-09-12 DIAGNOSIS — R53.83 FATIGUE, UNSPECIFIED TYPE: ICD-10-CM

## 2022-09-12 DIAGNOSIS — E78.5 HYPERLIPIDEMIA, UNSPECIFIED HYPERLIPIDEMIA TYPE: ICD-10-CM

## 2022-09-12 DIAGNOSIS — M10.9 GOUT, UNSPECIFIED CAUSE, UNSPECIFIED CHRONICITY, UNSPECIFIED SITE: ICD-10-CM

## 2022-09-12 DIAGNOSIS — Z12.5 SCREENING PSA (PROSTATE SPECIFIC ANTIGEN): ICD-10-CM

## 2022-09-13 ENCOUNTER — TELEPHONE (OUTPATIENT)
Dept: INTERNAL MEDICINE | Facility: CLINIC | Age: 72
End: 2022-09-13
Payer: MEDICARE

## 2022-09-13 NOTE — TELEPHONE ENCOUNTER
----- Message from Cheryl Lejeune sent at 9/12/2022  2:38 PM CDT -----  Regarding: RE: pv paul 9/19 1000  Left vm regarding OV, labs, and check in protocol.   ----- Message -----  From: Yolanda Miller LPN  Sent: 9/12/2022   2:19 PM CDT  To: Elina Richeyjeune  Subject: pv paul 9/19 1000                               Are there any outstanding tasks in chart? No, but needs FASTING labs PRIOR to appt    Is there any documentation of tasks? no    Has the pt seen another physician, been to ER, [facility], or admitted to hospital since last visit?    Has the pt done blood work or imaging since last visit?

## 2022-09-19 ENCOUNTER — OFFICE VISIT (OUTPATIENT)
Dept: INTERNAL MEDICINE | Facility: CLINIC | Age: 72
End: 2022-09-19
Payer: MEDICARE

## 2022-09-19 VITALS
RESPIRATION RATE: 18 BRPM | WEIGHT: 278 LBS | HEART RATE: 82 BPM | SYSTOLIC BLOOD PRESSURE: 126 MMHG | BODY MASS INDEX: 33.85 KG/M2 | HEIGHT: 76 IN | DIASTOLIC BLOOD PRESSURE: 88 MMHG | OXYGEN SATURATION: 96 %

## 2022-09-19 DIAGNOSIS — M10.9 GOUT, UNSPECIFIED CAUSE, UNSPECIFIED CHRONICITY, UNSPECIFIED SITE: ICD-10-CM

## 2022-09-19 DIAGNOSIS — Z00.00 ANNUAL PHYSICAL EXAM: Primary | ICD-10-CM

## 2022-09-19 DIAGNOSIS — I48.11 LONGSTANDING PERSISTENT ATRIAL FIBRILLATION: ICD-10-CM

## 2022-09-19 DIAGNOSIS — I10 PRIMARY HYPERTENSION: ICD-10-CM

## 2022-09-19 DIAGNOSIS — J40 BRONCHITIS: ICD-10-CM

## 2022-09-19 DIAGNOSIS — E78.5 HYPERLIPIDEMIA, UNSPECIFIED HYPERLIPIDEMIA TYPE: ICD-10-CM

## 2022-09-19 PROCEDURE — G0439 PR MEDICARE ANNUAL WELLNESS SUBSEQUENT VISIT: ICD-10-PCS | Mod: ,,, | Performed by: INTERNAL MEDICINE

## 2022-09-19 PROCEDURE — G0439 PPPS, SUBSEQ VISIT: HCPCS | Mod: ,,, | Performed by: INTERNAL MEDICINE

## 2022-09-19 NOTE — PROGRESS NOTES
Patient ID: Adam Jean Baptiste is a 72 y.o. male.    Chief Complaint: Medicare AWV (Will do labs after appt) and med request-Viagra, diuretics      Patient Care Team:  Omar Donahue MD as PCP - General (Internal Medicine)  Omar Donahue MD     HPI:   Patient presents here today for above reason.     Will do labs  later  , recently in May had bronchitis all resolved .    The patient's Health Maintenance was reviewed and the following appears to be due at this time:   Health Maintenance Due   Topic Date Due    TETANUS VACCINE  Never done    Colorectal Cancer Screening  Never done    Shingles Vaccine (1 of 2) Never done    Pneumococcal Vaccines (Age 65+) (2 - PPSV23 or PCV20) 09/10/2020    COVID-19 Vaccine (4 - Booster for Pfizer series) 04/22/2022    Influenza Vaccine (1) 09/01/2022        Past Medical History:  Past Medical History:   Diagnosis Date    Gout, unspecified     HLD (hyperlipidemia)     HTN (hypertension)     Paroxysmal atrial fibrillation      Past Surgical History:   Procedure Laterality Date    ADENOIDECTOMY      APPENDECTOMY      COLONOSCOPY  12/06/2006    LAPAROSCOPIC CHOLECYSTECTOMY      TONSILLECTOMY       Review of patient's allergies indicates:  No Known Allergies  Current Outpatient Medications on File Prior to Visit   Medication Sig Dispense Refill    allopurinoL (ZYLOPRIM) 300 MG tablet TAKE 1 TABLET BY MOUTH EVERY DAY 90 tablet 3    atorvastatin (LIPITOR) 20 MG tablet TAKE 1 TABLET BY MOUTH DAILY 30 tablet 5    co-enzyme Q-10 30 mg capsule Take 30 mg by mouth 3 (three) times daily.      metoprolol tartrate (LOPRESSOR) 25 MG tablet Take 25 mg by mouth once daily.      multivitamin capsule Take 1 capsule by mouth once daily.      rivaroxaban (XARELTO) 20 mg Tab Take 20 mg by mouth daily with dinner or evening meal.      zinc gluconate 50 mg tablet Take 50 mg by mouth once daily.      albuterol-ipratropium (DUO-NEB) 2.5 mg-0.5 mg/3 mL nebulizer solution Take 3 mLs by nebulization every 6  "(six) hours as needed for Wheezing. Rescue (Patient not taking: Reported on 9/19/2022) 75 mL 0     No current facility-administered medications on file prior to visit.     Social History     Socioeconomic History    Marital status:    Tobacco Use    Smoking status: Never    Smokeless tobacco: Never   Substance and Sexual Activity    Alcohol use: Yes     History reviewed. No pertinent family history.    ROS:   Review of Systems  Constitutional: No weight gain, No fever, No chills, No fatigue.   Eyes: No blurring, No visual disturbances.   Ear/Nose/Mouth/Throat: No decreased hearing, No ear pain, No nasal congestion, No sore throat.   Respiratory: No shortness of breath, No cough, No wheezing.   Cardiovascular: No chest pain, No palpitations, No peripheral edema.   Gastrointestinal: No nausea, No vomiting, No diarrhea, No constipation, No abdominal pain.   Genitourinary: No dysuria, No hematuria.   Hematology/Lymphatics: No bruising tendency, No bleeding tendency, No swollen lymph glands.   Endocrine: No excessive thirst, No polyuria, No excessive hunger.   Musculoskeletal: No joint pain, No muscle pain, No decreased range of motion.   Integumentary: No rash, No pruritus.   Neurologic: No abnormal balance, No confusion, No headache.   Psychiatric: No anxiety, No depression, Not suicidal, No hallucinations.        Patient Reported Health Risk Assessment         Vitals/PE:   /88 (BP Location: Left arm, Patient Position: Sitting)   Pulse 82   Resp 18   Ht 6' 3.98" (1.93 m)   Wt 126.1 kg (278 lb)   SpO2 96%   BMI 33.85 kg/m²   Physical Exam    General: Alert and oriented, No acute distress.   Eye: Normal conjunctiva without exudate.  HENMT: Normocephalic/AT, Normal hearing, Oral mucosa is moist and pink   Neck: No goiter visualized.   Respiratory: Lungs CTAB, Respirations are non-labored, Breath sounds are equal, Symmetrical chest wall expansion.  Cardiovascular: irregular irregular  No murmur, No " edema.   Gastrointestinal: Non-distended.   Genitourinary: Deferred.  Musculoskeletal: Normal ROM, Normal gait, No deformities or amputations. Tender left shoulder    Integumentary: Warm, Dry, Intact. No diaphoresis, or flushing.  Neurologic: No focal deficits, Cranial Nerves II-XII are grossly intact.   Psychiatric: Cooperative, Appropriate mood & affect, Normal judgment, Non-suicidal.    No flowsheet data found.  Fall Risk Assessment - Outpatient 9/19/2022   Mobility Status Ambulatory   Number of falls 0   Identified as fall risk 0           Depression Screening  Over the past two weeks, has the patient felt down, depressed, or hopeless?: No  Over the past two weeks, has the patient felt little interest or pleasure in doing things?: No  Functional Ability/Safety Screening  Was the patient's timed Up & Go test unsteady or longer than 30 seconds?: No  Does the patient need help with phone, transportation, shopping, preparing meals, housework, laundry, meds, or managing money?: No  Does the patient's home have rugs in the hallway, lack grab bars in the bathroom, lack handrails on the stairs or have poor lighting?: No  Have you noticed any hearing difficulties?: No  Cognitive Function (Assessed through direct observation with due consideration of information obtained by way of patient reports and/or concerns raised by family, friends, caretakers, or others)    Does the patient repeat questions/statements in the same day?: No  Does the patient have trouble remembering the date, year, and time?: No  Does the patient have difficulty managing finances?: No  Does the patient have a decreased sense of direction?: No    Assessment/Plan:   ..  Problem List Items Addressed This Visit    None     Recommendations:  Diet (healthy food choices, reduce portions and overall calorie intake)  Exercise 30-45 minutes at least 3x per week  Avoid excessive alcohol intake and tobacco use  Stay UTD with immunizations and preventative  screenings   Yearly Labs     ..      ..No orders of the defined types were placed in this encounter.        I am having Adam Jean Baptiste maintain his rivaroxaban, metoprolol tartrate, multivitamin, co-enzyme Q-10, zinc gluconate, albuterol-ipratropium, allopurinoL, and atorvastatin.    No orders of the defined types were placed in this encounter.      Education and counseling done face to face regarding medical conditions and plan. Contact office if new symptoms develop. Should any symptoms ever significantly worsen seek emergency medical attention/go to ER. Follow up at least yearly for wellness or sooner PRN. Nurse to call patient with any results. The patient is receptive, expresses understanding and is agreeable to plan. All questions have been answered.          Advance Care Planning   I attest to discussing Advance Care Planning with patient and/or family member.  Education was provided including the importance of the Health Care Power of , Advance Directives, and/or [place] documentation.  The patient expressed understanding to the importance of this information and discussion.           No follow-ups on file.

## 2022-09-22 ENCOUNTER — TELEPHONE (OUTPATIENT)
Dept: INTERNAL MEDICINE | Facility: CLINIC | Age: 72
End: 2022-09-22
Payer: MEDICARE

## 2022-09-22 DIAGNOSIS — N52.9 ERECTILE DYSFUNCTION, UNSPECIFIED ERECTILE DYSFUNCTION TYPE: Primary | ICD-10-CM

## 2022-09-22 RX ORDER — SILDENAFIL 50 MG/1
50 TABLET, FILM COATED ORAL DAILY PRN
Qty: 30 TABLET | Refills: 0 | Status: SHIPPED | OUTPATIENT
Start: 2022-09-22 | End: 2023-03-08 | Stop reason: SDUPTHER

## 2022-09-22 NOTE — TELEPHONE ENCOUNTER
Pt called to advise that the 2 medication that Dr. Donahue advised at last OV was not received by [facility]    **Chart Note Indicates:   Viagra and Diuretics  No Meds Sent to pharmacy  Please Advise

## 2022-10-24 ENCOUNTER — LAB VISIT (OUTPATIENT)
Dept: LAB | Facility: HOSPITAL | Age: 72
End: 2022-10-24
Attending: INTERNAL MEDICINE
Payer: MEDICARE

## 2022-10-24 DIAGNOSIS — I10 HYPERTENSION, UNSPECIFIED TYPE: ICD-10-CM

## 2022-10-24 DIAGNOSIS — Z12.5 SCREENING PSA (PROSTATE SPECIFIC ANTIGEN): ICD-10-CM

## 2022-10-24 DIAGNOSIS — M10.9 GOUT, UNSPECIFIED CAUSE, UNSPECIFIED CHRONICITY, UNSPECIFIED SITE: ICD-10-CM

## 2022-10-24 DIAGNOSIS — Z00.00 WELLNESS EXAMINATION: ICD-10-CM

## 2022-10-24 DIAGNOSIS — R53.83 FATIGUE, UNSPECIFIED TYPE: ICD-10-CM

## 2022-10-24 DIAGNOSIS — E78.5 HYPERLIPIDEMIA, UNSPECIFIED HYPERLIPIDEMIA TYPE: ICD-10-CM

## 2022-10-24 LAB
ALBUMIN SERPL-MCNC: 3.9 GM/DL (ref 3.4–4.8)
ALBUMIN/GLOB SERPL: 1.1 RATIO (ref 1.1–2)
ALP SERPL-CCNC: 46 UNIT/L (ref 40–150)
ALT SERPL-CCNC: 50 UNIT/L (ref 0–55)
AST SERPL-CCNC: 43 UNIT/L (ref 5–34)
BASOPHILS # BLD AUTO: 0.06 X10(3)/MCL (ref 0–0.2)
BASOPHILS NFR BLD AUTO: 1 %
BILIRUBIN DIRECT+TOT PNL SERPL-MCNC: 0.9 MG/DL
BUN SERPL-MCNC: 8.6 MG/DL (ref 8.4–25.7)
CALCIUM SERPL-MCNC: 9.1 MG/DL (ref 8.8–10)
CHLORIDE SERPL-SCNC: 106 MMOL/L (ref 98–107)
CHOLEST SERPL-MCNC: 171 MG/DL
CHOLEST/HDLC SERPL: 3 {RATIO} (ref 0–5)
CO2 SERPL-SCNC: 27 MMOL/L (ref 23–31)
CREAT SERPL-MCNC: 0.73 MG/DL (ref 0.73–1.18)
EOSINOPHIL # BLD AUTO: 0.16 X10(3)/MCL (ref 0–0.9)
EOSINOPHIL NFR BLD AUTO: 2.8 %
ERYTHROCYTE [DISTWIDTH] IN BLOOD BY AUTOMATED COUNT: 13.2 % (ref 11.5–17)
GFR SERPLBLD CREATININE-BSD FMLA CKD-EPI: >60 MLS/MIN/1.73/M2
GLOBULIN SER-MCNC: 3.4 GM/DL (ref 2.4–3.5)
GLUCOSE SERPL-MCNC: 104 MG/DL (ref 82–115)
HCT VFR BLD AUTO: 47 % (ref 42–52)
HDLC SERPL-MCNC: 57 MG/DL (ref 35–60)
HGB BLD-MCNC: 15.8 GM/DL (ref 14–18)
IMM GRANULOCYTES # BLD AUTO: 0.02 X10(3)/MCL (ref 0–0.04)
IMM GRANULOCYTES NFR BLD AUTO: 0.3 %
LDLC SERPL CALC-MCNC: 94 MG/DL (ref 50–140)
LYMPHOCYTES # BLD AUTO: 2.26 X10(3)/MCL (ref 0.6–4.6)
LYMPHOCYTES NFR BLD AUTO: 39 %
MCH RBC QN AUTO: 32.4 PG (ref 27–31)
MCHC RBC AUTO-ENTMCNC: 33.6 MG/DL (ref 33–36)
MCV RBC AUTO: 96.5 FL (ref 80–94)
MONOCYTES # BLD AUTO: 0.64 X10(3)/MCL (ref 0.1–1.3)
MONOCYTES NFR BLD AUTO: 11.1 %
NEUTROPHILS # BLD AUTO: 2.7 X10(3)/MCL (ref 2.1–9.2)
NEUTROPHILS NFR BLD AUTO: 45.8 %
NRBC BLD AUTO-RTO: 0 %
PLATELET # BLD AUTO: 160 X10(3)/MCL (ref 130–400)
PMV BLD AUTO: 10.3 FL (ref 7.4–10.4)
POTASSIUM SERPL-SCNC: 4.2 MMOL/L (ref 3.5–5.1)
PROT SERPL-MCNC: 7.3 GM/DL (ref 5.8–7.6)
PSA SERPL-MCNC: 0.92 NG/ML
RBC # BLD AUTO: 4.87 X10(6)/MCL (ref 4.7–6.1)
SODIUM SERPL-SCNC: 143 MMOL/L (ref 136–145)
TRIGL SERPL-MCNC: 100 MG/DL (ref 34–140)
TSH SERPL-ACNC: 1.21 UIU/ML (ref 0.35–4.94)
URATE SERPL-MCNC: 4.1 MG/DL (ref 3.5–7.2)
VLDLC SERPL CALC-MCNC: 20 MG/DL
WBC # SPEC AUTO: 5.8 X10(3)/MCL (ref 4.5–11.5)

## 2022-10-24 PROCEDURE — 84153 ASSAY OF PSA TOTAL: CPT

## 2022-10-24 PROCEDURE — 80061 LIPID PANEL: CPT

## 2022-10-24 PROCEDURE — 36415 COLL VENOUS BLD VENIPUNCTURE: CPT

## 2022-10-24 PROCEDURE — 84443 ASSAY THYROID STIM HORMONE: CPT

## 2022-10-24 PROCEDURE — 80053 COMPREHEN METABOLIC PANEL: CPT

## 2022-10-24 PROCEDURE — 85025 COMPLETE CBC W/AUTO DIFF WBC: CPT

## 2022-10-24 PROCEDURE — 84550 ASSAY OF BLOOD/URIC ACID: CPT

## 2022-12-05 ENCOUNTER — PATIENT OUTREACH (OUTPATIENT)
Dept: ADMINISTRATIVE | Facility: HOSPITAL | Age: 72
End: 2022-12-05
Payer: MEDICARE

## 2023-01-03 ENCOUNTER — HOSPITAL ENCOUNTER (EMERGENCY)
Facility: HOSPITAL | Age: 73
Discharge: HOME | End: 2023-01-04
Attending: EMERGENCY MEDICINE
Payer: MEDICARE

## 2023-01-03 VITALS
HEIGHT: 76 IN | TEMPERATURE: 98 F | WEIGHT: 265 LBS | OXYGEN SATURATION: 96 % | BODY MASS INDEX: 32.27 KG/M2 | SYSTOLIC BLOOD PRESSURE: 160 MMHG | RESPIRATION RATE: 18 BRPM | DIASTOLIC BLOOD PRESSURE: 96 MMHG | HEART RATE: 108 BPM

## 2023-01-03 DIAGNOSIS — S02.2XXB OPEN FRACTURE OF NASAL BONE, INITIAL ENCOUNTER: Primary | ICD-10-CM

## 2023-01-03 DIAGNOSIS — R07.9 CHEST PAIN: ICD-10-CM

## 2023-01-03 LAB
ALBUMIN SERPL-MCNC: 3.6 G/DL (ref 3.4–4.8)
ALBUMIN/GLOB SERPL: 0.9 RATIO (ref 1.1–2)
ALP SERPL-CCNC: 57 UNIT/L (ref 40–150)
ALT SERPL-CCNC: 30 UNIT/L (ref 0–55)
APTT PPP: 38.6 SECONDS (ref 23.4–33.9)
AST SERPL-CCNC: 37 UNIT/L (ref 5–34)
BASOPHILS # BLD AUTO: 0.09 X10(3)/MCL (ref 0–0.2)
BASOPHILS NFR BLD AUTO: 0.9 %
BILIRUBIN DIRECT+TOT PNL SERPL-MCNC: 0.4 MG/DL
BNP BLD-MCNC: 72.9 PG/ML
BUN SERPL-MCNC: 9.7 MG/DL (ref 8.4–25.7)
CALCIUM SERPL-MCNC: 9.5 MG/DL (ref 8.8–10)
CHLORIDE SERPL-SCNC: 105 MMOL/L (ref 98–107)
CO2 SERPL-SCNC: 24 MMOL/L (ref 23–31)
CREAT SERPL-MCNC: 0.79 MG/DL (ref 0.73–1.18)
EOSINOPHIL # BLD AUTO: 0.18 X10(3)/MCL (ref 0–0.9)
EOSINOPHIL NFR BLD AUTO: 1.7 %
ERYTHROCYTE [DISTWIDTH] IN BLOOD BY AUTOMATED COUNT: 12.8 % (ref 11.6–14.4)
GFR SERPLBLD CREATININE-BSD FMLA CKD-EPI: >90 MLS/MIN/1.73/M2
GLOBULIN SER-MCNC: 4.1 GM/DL (ref 2.4–3.5)
GLUCOSE SERPL-MCNC: 191 MG/DL (ref 82–115)
HCT VFR BLD AUTO: 43.1 % (ref 42–52)
HGB BLD-MCNC: 14.6 GM/DL (ref 14–18)
IMM GRANULOCYTES # BLD AUTO: 0.05 X10(3)/MCL (ref 0–0.04)
IMM GRANULOCYTES NFR BLD AUTO: 0.5 %
INR BLD: 1.88 (ref 2–3)
LYMPHOCYTES # BLD AUTO: 3.96 X10(3)/MCL (ref 0.6–4.6)
LYMPHOCYTES NFR BLD AUTO: 37.9 %
MCH RBC QN AUTO: 32.4 PG
MCHC RBC AUTO-ENTMCNC: 33.9 MG/DL (ref 33–36)
MCV RBC AUTO: 95.6 FL (ref 80–94)
MONOCYTES # BLD AUTO: 0.79 X10(3)/MCL (ref 0.1–1.3)
MONOCYTES NFR BLD AUTO: 7.6 %
NEUTROPHILS # BLD AUTO: 5.38 X10(3)/MCL (ref 2.1–9.2)
NEUTROPHILS NFR BLD AUTO: 51.4 %
NRBC BLD AUTO-RTO: 0 % (ref 0–1)
PLATELET # BLD AUTO: 170 X10(3)/MCL (ref 140–371)
PMV BLD AUTO: 9.6 FL (ref 9.4–12.4)
POTASSIUM SERPL-SCNC: 3.9 MMOL/L (ref 3.5–5.1)
PROT SERPL-MCNC: 7.7 GM/DL (ref 5.8–7.6)
PROTHROMBIN TIME: 21.2 SECONDS (ref 11.7–14.5)
RBC # BLD AUTO: 4.51 X10(6)/MCL (ref 4.7–6.1)
SODIUM SERPL-SCNC: 139 MMOL/L (ref 136–145)
TROPONIN I SERPL-MCNC: <0.01 NG/ML (ref 0–0.04)
WBC # SPEC AUTO: 10.5 X10(3)/MCL (ref 4.5–11.5)

## 2023-01-03 PROCEDURE — 80053 COMPREHEN METABOLIC PANEL: CPT | Performed by: PHYSICIAN ASSISTANT

## 2023-01-03 PROCEDURE — 12011 RPR F/E/E/N/L/M 2.5 CM/<: CPT | Mod: 59

## 2023-01-03 PROCEDURE — 99285 EMERGENCY DEPT VISIT HI MDM: CPT | Mod: 25

## 2023-01-03 PROCEDURE — 93010 EKG 12-LEAD: ICD-10-PCS | Mod: ,,, | Performed by: INTERNAL MEDICINE

## 2023-01-03 PROCEDURE — 93005 ELECTROCARDIOGRAM TRACING: CPT

## 2023-01-03 PROCEDURE — 25000003 PHARM REV CODE 250: Performed by: EMERGENCY MEDICINE

## 2023-01-03 PROCEDURE — 83880 ASSAY OF NATRIURETIC PEPTIDE: CPT | Performed by: PHYSICIAN ASSISTANT

## 2023-01-03 PROCEDURE — 85730 THROMBOPLASTIN TIME PARTIAL: CPT | Performed by: PHYSICIAN ASSISTANT

## 2023-01-03 PROCEDURE — 93010 ELECTROCARDIOGRAM REPORT: CPT | Mod: ,,, | Performed by: INTERNAL MEDICINE

## 2023-01-03 PROCEDURE — 30901 CONTROL OF NOSEBLEED: CPT

## 2023-01-03 PROCEDURE — 84484 ASSAY OF TROPONIN QUANT: CPT | Performed by: PHYSICIAN ASSISTANT

## 2023-01-03 PROCEDURE — 85610 PROTHROMBIN TIME: CPT | Performed by: PHYSICIAN ASSISTANT

## 2023-01-03 PROCEDURE — 85025 COMPLETE CBC W/AUTO DIFF WBC: CPT | Performed by: PHYSICIAN ASSISTANT

## 2023-01-03 RX ORDER — OXYMETAZOLINE HCL 0.05 %
3 SPRAY, NON-AEROSOL (ML) NASAL
Status: COMPLETED | OUTPATIENT
Start: 2023-01-03 | End: 2023-01-03

## 2023-01-03 RX ADMIN — Medication 3 SPRAY: at 10:01

## 2023-01-04 PROCEDURE — 25000003 PHARM REV CODE 250: Performed by: EMERGENCY MEDICINE

## 2023-01-04 PROCEDURE — 90471 IMMUNIZATION ADMIN: CPT | Performed by: EMERGENCY MEDICINE

## 2023-01-04 PROCEDURE — 12011 RPR F/E/E/N/L/M 2.5 CM/<: CPT | Mod: 59

## 2023-01-04 PROCEDURE — 90715 TDAP VACCINE 7 YRS/> IM: CPT | Performed by: EMERGENCY MEDICINE

## 2023-01-04 PROCEDURE — 63600175 PHARM REV CODE 636 W HCPCS: Performed by: EMERGENCY MEDICINE

## 2023-01-04 RX ORDER — LIDOCAINE HYDROCHLORIDE AND EPINEPHRINE 20; 10 MG/ML; UG/ML
3 INJECTION, SOLUTION INFILTRATION; PERINEURAL ONCE
Status: DISCONTINUED | OUTPATIENT
Start: 2023-01-04 | End: 2023-01-04

## 2023-01-04 RX ORDER — AMOXICILLIN AND CLAVULANATE POTASSIUM 875; 125 MG/1; MG/1
1 TABLET, FILM COATED ORAL 2 TIMES DAILY
Qty: 14 TABLET | Refills: 0 | Status: SHIPPED | OUTPATIENT
Start: 2023-01-04 | End: 2023-03-21

## 2023-01-04 RX ORDER — LIDOCAINE HYDROCHLORIDE AND EPINEPHRINE 20; 10 MG/ML; UG/ML
1 INJECTION, SOLUTION INFILTRATION; PERINEURAL ONCE
Status: COMPLETED | OUTPATIENT
Start: 2023-01-04 | End: 2023-01-04

## 2023-01-04 RX ADMIN — TETANUS TOXOID, REDUCED DIPHTHERIA TOXOID AND ACELLULAR PERTUSSIS VACCINE, ADSORBED 0.5 ML: 5; 2.5; 8; 8; 2.5 SUSPENSION INTRAMUSCULAR at 01:01

## 2023-01-04 RX ADMIN — LIDOCAINE HYDROCHLORIDE AND EPINEPHRINE 1 ML: 20; 10 INJECTION, SOLUTION INFILTRATION; PERINEURAL at 01:01

## 2023-01-04 NOTE — ED PROVIDER NOTES
Encounter Date: 1/3/2023       History     Chief Complaint   Patient presents with    Chest Pain    Fall     72-year-old male states that he fell asleep in his chair and he suddenly woke up and jumped up and then fell flat on his face on the carpet.  States this has happened to him before we jumps up out of asleep and then falls.  He denies loss of consciousness and denies injury other than to his nose.  He is on Xarelto for Afib and has profuse bleeding from his nose.  No respiratory distress.  No loss of consciousness. No head or neck pain.      Review of patient's allergies indicates:  No Known Allergies  Past Medical History:   Diagnosis Date    Gout, unspecified     HLD (hyperlipidemia)     HTN (hypertension)     Paroxysmal atrial fibrillation      Past Surgical History:   Procedure Laterality Date    ADENOIDECTOMY      APPENDECTOMY      COLONOSCOPY  12/06/2006    LAPAROSCOPIC CHOLECYSTECTOMY      TONSILLECTOMY       No family history on file.  Social History     Tobacco Use    Smoking status: Never    Smokeless tobacco: Never   Substance Use Topics    Alcohol use: Yes     Review of Systems   HENT:  Positive for nosebleeds.    All other systems reviewed and are negative.    Physical Exam     Initial Vitals [01/03/23 2204]   BP Pulse Resp Temp SpO2   (!) 160/96 108 18 98.1 °F (36.7 °C) 96 %      MAP       --         Physical Exam    Nursing note and vitals reviewed.  Constitutional: Vital signs are normal. He appears well-developed and well-nourished.   HENT:   Head: Normocephalic and atraumatic.   Right Ear: External ear normal.   Left Ear: External ear normal.   Mouth/Throat: Oropharynx is clear and moist.   Superficial laceration noted to the bridge of the nose and 1 cm laceration noted to the end of the nose.  Diffuse bruising and swelling noted to the nose with brisk bleeding from his right nare.  He is coughing/spitting blood but in no distress.  No oral or dental injury   Eyes: Pupils are equal, round,  and reactive to light.   Neck: Neck supple.   Cardiovascular:  Normal rate, regular rhythm and normal heart sounds.           Pulmonary/Chest: Breath sounds normal. No respiratory distress.   Abdominal: Abdomen is soft. There is no abdominal tenderness.   Musculoskeletal:      Cervical back: Neck supple. No edema or erythema.     Neurological: He is alert and oriented to person, place, and time. GCS score is 15. GCS eye subscore is 4. GCS verbal subscore is 5. GCS motor subscore is 6.   Skin: Skin is warm and dry. Capillary refill takes less than 2 seconds.       ED Course   Epistaxis Mgmt    Date/Time: 1/4/2023 5:52 AM  Performed by: Verenice Dailey MD  Authorized by: Verenice Dailey MD   Consent Done: Emergent Situation    Patient sedated: no  Treatment site: right anterior  Repair method: Surgicel guaze.  Post-procedure assessment: bleeding stopped  Patient tolerance: Patient tolerated the procedure well with no immediate complications  Comments: Patient's nose was sprayed several times with Qasim-Synephrine with no improvement in the bleeding.  I attempted to place a rhino rocket but it was too large and due to the deviated septum, would not pass.  I then packed his nose with a Surgicel gauze which decreased the flow of blood and after observation over the next hour, the bleeding completely stopped.  He initially had a small amount of bleeding from the left side of his nose but he was able to bleed through that side so did not pack it and the bleeding on that side eventually stopped as well.      Lac Repair    Date/Time: 1/4/2023 5:15 PM  Performed by: Verenice Dailey MD  Authorized by: Verenice Dailey MD     Consent:     Consent obtained:  Verbal    Consent given by:  Patient    Risks, benefits, and alternatives were discussed: yes      Risks discussed:  Infection, pain, need for additional repair, poor cosmetic result and poor wound healing    Alternatives discussed:  No treatment  Universal  protocol:     Procedure explained and questions answered to patient or proxy's satisfaction: yes      Imaging studies available: yes      Patient identity confirmed:  Verbally with patient  Anesthesia:     Anesthesia method:  Local infiltration    Local anesthetic:  Lidocaine 1% w/o epi  Laceration details:     Location: end of nose.    Length (cm):  1    Depth (mm):  4  Exploration:     Contaminated: no    Treatment:     Area cleansed with:  Povidone-iodine    Amount of cleaning:  Standard  Skin repair:     Repair method:  Sutures    Suture size:  5-0    Suture material:  Nylon    Suture technique:  Simple interrupted    Number of sutures:  5  Approximation:     Approximation:  Close  Repair type:     Repair type:  Simple  Post-procedure details:     Dressing:  Antibiotic ointment    Procedure completion:  Tolerated well, no immediate complications  Lac Repair    Date/Time: 1/4/2023 5:17 PM  Performed by: Verenice Dailey MD  Authorized by: Verenice Dailey MD     Consent:     Consent obtained:  Verbal    Consent given by:  Patient    Risks discussed:  Infection, pain, need for additional repair and poor cosmetic result    Alternatives discussed:  No treatment  Universal protocol:     Procedure explained and questions answered to patient or proxy's satisfaction: yes      Imaging studies available: yes      Patient identity confirmed:  Verbally with patient  Anesthesia:     Anesthesia method:  None  Laceration details:     Location: bridge of nose.    Length (cm):  0.5    Depth (mm):  3  Pre-procedure details:     Preparation:  Patient was prepped and draped in usual sterile fashion and imaging obtained to evaluate for foreign bodies  Exploration:     Hemostasis achieved with:  Direct pressure    Imaging outcome: foreign body not noted    Treatment:     Area cleansed with:  Povidone-iodine  Skin repair:     Repair method:  Tissue adhesive  Approximation:     Approximation:  Close  Repair type:     Repair type:   Simple  Post-procedure details:     Dressing:  Open (no dressing)    Procedure completion:  Tolerated well, no immediate complications  Labs Reviewed   COMPREHENSIVE METABOLIC PANEL - Abnormal; Notable for the following components:       Result Value    Glucose Level 191 (*)     Protein Total 7.7 (*)     Globulin 4.1 (*)     Albumin/Globulin Ratio 0.9 (*)     Aspartate Aminotransferase 37 (*)     All other components within normal limits   APTT - Abnormal; Notable for the following components:    PTT 38.6 (*)     All other components within normal limits   PROTIME-INR - Abnormal; Notable for the following components:    PT 21.2 (*)     INR 1.88 (*)     All other components within normal limits   CBC WITH DIFFERENTIAL - Abnormal; Notable for the following components:    RBC 4.51 (*)     MCV 95.6 (*)     IG# 0.05 (*)     All other components within normal limits   B-TYPE NATRIURETIC PEPTIDE - Normal   TROPONIN I - Normal   CBC W/ AUTO DIFFERENTIAL    Narrative:     The following orders were created for panel order CBC auto differential.  Procedure                               Abnormality         Status                     ---------                               -----------         ------                     CBC with Differential[[id number]]        Abnormal            Final result                 Please view results for these tests on the individual orders.        ECG Results              EKG 12-lead (Final result)  Result time 01/04/23 11:30:19      Final result by Interface, Lab In [facility] (01/04/23 11:30:19)                   Narrative:    Test Reason : R07.9,    Vent. Rate : 103 BPM     Atrial Rate : 100 BPM     P-R Int : 000 ms          QRS Dur : 086 ms      QT Int : 360 ms       P-R-T Axes : 000 032 030 degrees     QTc Int : 471 ms    Atrial fibrillation with rapid ventricular response    Abnormal ECG  Confirmed by Jono Duran MD ([id number]) on 1/4/2023 11:30:10 AM    Referred By: [email or web address]   SELF            Confirmed By:Jono Duran MD                                  Imaging Results              CT Maxillofacial Without Contrast (Final result)  Result time 01/04/23 07:25:39      Final result by Ferdinand Gloria MD (01/04/23 07:25:39)                   Impression:    Impression:    1. There are mildly displaced fractures seen involving the bilateral nasal bones and the bony nasal septum. There is associated soft tissue swelling and hematoma in the soft tissues overlying the nose and also in the right nasal cavity.    2. There is small hemorrhagic fluid in the right maxillary sinus consistent with hemosinus.    3. Details and other findings as noted above.      Electronically signed by: Ferdinand Gloria  Date:    01/04/2023  Time:    07:25               Narrative:    CLINICAL HISTORY:  Trauma.    TECHNIQUE:  Maxillofacial CT was performed without  contrast. There are sagittal and coronal reconstructed images available for review.    Automatic exposure control was utilized to reduce the patient's radiation dose.    DLP = 363    COMPARISON:  No prior imaging available for comparison.    FINDINGS:  Orbital soft tissues: The orbital soft tissues appear unremarkable.    Bones:    Orbital bony structures: The bilateral orbital bony structures are intact with no orbital fracture identified.    Mandible: The mandible appears unremarkable.    Maxilla: The maxilla appears unremarkable.    Pterygoid plates: No fracture identified of the right or left pterygoid plates.    Zygoma: The zygomatic arches are intact.    TMJ: The mandibular condyles appear normally placed with respect to the mandibular fossa.    Nasal Bones: There are mildly displaced fractures seen involving the bilateral nasal bones and the bony nasal septum. There is associated soft tissue swelling and hematoma in the soft tissues overlying the nose and also in the right nasal cavity.    Skull: No acute linear or depressed fracture is identified in the visualized  skull.    Paranasal sinuses: There is small hemorrhagic fluid in the right maxillary sinus consistent with hemosinus. Mild mucosal thickening is seen in both anterior ethmoid air cells and in the left maxillary sinus. This is suggestive of sinusitis. The rest of the paranasal sinuses are clear.    Mastoid air cells: The visualized mastoid air cells appear clear.    Brain: Intracranial findings discussed separately.                        Preliminary result by Ferdinand Gloria MD (01/04/23 00:41:36)                   Narrative:    START OF REPORT:  Technique: Noncontrast maxillofacial CT was performed with axial as well as sagittal and coronal images being submitted for interpretation.    Comparison: None.    Clinical history: Pt states he got up to go to bed and next thing he knew he was face first on the floor.    Findings:  Orbital soft tissues: The orbital soft tissues appear unremarkable.  Bones:  Orbital bony structures: The bilateral orbital bony structures are intact with no orbital fracture identified.  Mandible: The mandible appears unremarkable.  Maxilla: The maxilla appears unremarkable.  Pterygoid plates: No fracture identified of the right or left pterygoid plates.  Zygoma: The zygomatic arches are intact.  TMJ: The mandibular condyles appear normally placed with respect to the mandibular fossa.  Nasal Bones: There are mildly displaced fractures seen involving the bilateral nasal bones and the bony nasal septum. There is associated soft tissue swelling and hematoma in the soft tissues overlying the nose and also in the right nasal cavity.  Skull: No acute linear or depressed fracture is identified in the visualized skull.  Paranasal sinuses: There is small hemorrhagic fluid in the right maxillary sinus consistent with hemosinus. Mild mucosal thickening is seen in both anterior ethmoid air cells and in the left maxillary sinus. This is suggestive of sinusitis. The rest of the paranasal sinuses are  clear.  Mastoid air cells: The visualized mastoid air cells appear clear.  Brain: Intracranial findings discussed separately.      Impression:  1. There are mildly displaced fractures seen involving the bilateral nasal bones and the bony nasal septum. There is associated soft tissue swelling and hematoma in the soft tissues overlying the nose and also in the right nasal cavity.  2. There is small hemorrhagic fluid in the right maxillary sinus consistent with hemosinus.  3. Details and other findings as noted above.                          Preliminary result by Devyn Solano MD (01/04/23 00:41:36)                   Narrative:    START OF REPORT:  Technique: Noncontrast maxillofacial CT was performed with axial as well as sagittal and coronal images being submitted for interpretation.    Comparison: None.    Clinical history: Pt states he got up to go to bed and next thing he knew he was face first on the floor.    Findings:  Orbital soft tissues: The orbital soft tissues appear unremarkable.  Bones:  Orbital bony structures: The bilateral orbital bony structures are intact with no orbital fracture identified.  Mandible: The mandible appears unremarkable.  Maxilla: The maxilla appears unremarkable.  Pterygoid plates: No fracture identified of the right or left pterygoid plates.  Zygoma: The zygomatic arches are intact.  TMJ: The mandibular condyles appear normally placed with respect to the mandibular fossa.  Nasal Bones: There are mildly displaced fractures seen involving the bilateral nasal bones and the bony nasal septum. There is associated soft tissue swelling and hematoma in the soft tissues overlying the nose and also in the right nasal cavity.  Skull: No acute linear or depressed fracture is identified in the visualized skull.  Paranasal sinuses: There is small hemorrhagic fluid in the right maxillary sinus consistent with hemosinus. Mild mucosal thickening is seen in both anterior ethmoid air cells and in  the left maxillary sinus. This is suggestive of sinusitis. The rest of the paranasal sinuses are clear.  Mastoid air cells: The visualized mastoid air cells appear clear.  Brain: Intracranial findings discussed separately.      Impression:  1. There are mildly displaced fractures seen involving the bilateral nasal bones and the bony nasal septum. There is associated soft tissue swelling and hematoma in the soft tissues overlying the nose and also in the right nasal cavity.  2. There is small hemorrhagic fluid in the right maxillary sinus consistent with hemosinus.  3. Details and other findings as noted above.                                         CT Cervical Spine Without Contrast (Final result)  Result time 01/04/23 07:20:21      Final result by Ferdinand Gloria MD (01/04/23 07:20:21)                   Impression:    Impression:    1. No acute cervical spine fracture dislocation or subluxation is seen.    2. Degenerative changes and other details as above. Details and other findings as noted above.      Electronically signed by: Ferdinand Gloria  Date:    01/04/2023  Time:    07:20               Narrative:    EXAMINATION:  CT CERVICAL SPINE WITHOUT CONTRAST    CLINICAL HISTORY:  Neck trauma (Age >= 65y);    TECHNIQUE:  CT of the cervical spine Without contrast. Sagittal and coronal reconstructions were performed on the source images.    Automatic exposure control was utilized to reduce the patient's radiation dose.    DLP = 1062.    COMPARISON:  No prior imaging available for comparison.    FINDINGS:  Lung apices: The visualized lung apices appear unremarkable.    Spine:    Spinal canal: The spinal canal appears unremarkable.    Spinal cord: The spinal cord appears unremarkable.    Mineralization: Within normal limits for age.    Rotation: No significant rotation is seen.    Scoliosis: No significant scoliosis is seen.    Vertebral Fusion: No vertebral fusion is identified.    Listhesis: No significant listhesis  is identified.    Lordosis: Degenerative straightening of the cervical lordosis is seen.    Intervertebral disc spaces: Multilevel loss of disc height is seen.    Osteophytes: Multilevel endplate osteophytes are seen.    Endplate Sclerosis: Multilevel endplate sclerosis is seen.    Uncovertebral degenerative changes: Multilevel uncovertebral joint arthrosis is seen.    Facet degenerative changes: Multilevel facet degenerative changes are seen.    Fractures: No acute cervical spine fracture dislocation or subluxation is seen.    Orthopedic Hardware: None.    Miscellaneous:    Mastoid air cells: The visualized mastoid air cells appear clear.    Soft Tissues: Unremarkable.                        Preliminary result by Ferdinand Gloria MD (01/04/23 00:35:25)                   Narrative:    START OF REPORT:  Technique: CT of the cervical spine was performed without intravenous contrast with axial as well as sagittal and coronal images.    Comparison: None.    Dosage Information: Automated Exposure Control was utilized 520.94 mGy.cm.    Clinical history: Pt states he got up to go to bed and next thing he knew he was face first on the floor.    Findings:  Lung apices: The visualized lung apices appear unremarkable.  Spine:  Spinal canal: The spinal canal appears unremarkable.  Spinal cord: The spinal cord appears unremarkable.  Mineralization: Within normal limits for age.  Rotation: No significant rotation is seen.  Scoliosis: No significant scoliosis is seen.  Vertebral Fusion: No vertebral fusion is identified.  Listhesis: No significant listhesis is identified.  Lordosis: Degenerative straightening of the cervical lordosis is seen.  Intervertebral disc spaces: Multilevel loss of disc height is seen.  Osteophytes: Multilevel endplate osteophytes are seen.  Endplate Sclerosis: Multilevel endplate sclerosis is seen.  Uncovertebral degenerative changes: Multilevel uncovertebral joint arthrosis is seen.  Facet degenerative  changes: Multilevel facet degenerative changes are seen.  Fractures: No acute cervical spine fracture dislocation or subluxation is seen.  Orthopedic Hardware: None.    Miscellaneous:  Mastoid air cells: The visualized mastoid air cells appear clear.  Soft Tissues: Unremarkable.      Impression:  1. No acute cervical spine fracture dislocation or subluxation is seen.  2. Degenerative changes and other details as above. Details and other findings as noted above.                          Preliminary result by Devyn Solano MD (01/04/23 00:35:25)                   Narrative:    START OF REPORT:  Technique: CT of the cervical spine was performed without intravenous contrast with axial as well as sagittal and coronal images.    Comparison: None.    Dosage Information: Automated Exposure Control was utilized 520.94 mGy.cm.    Clinical history: Pt states he got up to go to bed and next thing he knew he was face first on the floor.    Findings:  Lung apices: The visualized lung apices appear unremarkable.  Spine:  Spinal canal: The spinal canal appears unremarkable.  Spinal cord: The spinal cord appears unremarkable.  Mineralization: Within normal limits for age.  Rotation: No significant rotation is seen.  Scoliosis: No significant scoliosis is seen.  Vertebral Fusion: No vertebral fusion is identified.  Listhesis: No significant listhesis is identified.  Lordosis: Degenerative straightening of the cervical lordosis is seen.  Intervertebral disc spaces: Multilevel loss of disc height is seen.  Osteophytes: Multilevel endplate osteophytes are seen.  Endplate Sclerosis: Multilevel endplate sclerosis is seen.  Uncovertebral degenerative changes: Multilevel uncovertebral joint arthrosis is seen.  Facet degenerative changes: Multilevel facet degenerative changes are seen.  Fractures: No acute cervical spine fracture dislocation or subluxation is seen.  Orthopedic Hardware: None.    Miscellaneous:  Mastoid air cells: The  visualized mastoid air cells appear clear.  Soft Tissues: Unremarkable.      Impression:  1. No acute cervical spine fracture dislocation or subluxation is seen.  2. Degenerative changes and other details as above. Details and other findings as noted above.                                         CT Head Without Contrast (Final result)  Result time 01/04/23 07:19:01      Final result by Ferdinand Gloria MD (01/04/23 07:19:01)                   Impression:      No acute intracranial abnormality identified.  Refer to dedicated maxillofacial CT.      Electronically signed by: Ferdinand Gloria  Date:    01/04/2023  Time:    07:19               Narrative:    EXAMINATION:  CT HEAD WITHOUT CONTRAST    CLINICAL HISTORY:  Head trauma, minor (Age >= 65y);    TECHNIQUE:  Low dose axial images were obtained through the head.  Coronal and sagittal reformations were also performed. Contrast was not administered.    Automatic exposure control was utilized to reduce the patient's radiation dose.    DLP= 1062    COMPARISON:  None.    FINDINGS:  No acute intracranial hemorrhage, edema or mass. No acute parenchymal abnormality.    Mild cerebral atrophy with concordant ventricular enlargement.    There is normal gray white differentiation.    The osseous structures are normal.    The mastoid air cells are clear.    The auditory canals are patent bilaterally.                        Preliminary result by Ferdinand Gloria MD (01/04/23 00:34:34)                   Narrative:    START OF REPORT:  Technique: CT of the head was performed without intravenous contrast with axial as well as coronal and sagittal images.    Comparison: None.    Dosage Information: Automated Exposure Control was utilized 1061.62 mGy.cm.    Clinical history: Pt states he got up to go to bed and next thing he knew he was face first on the floor.    Findings:  Hemorrhage: No acute intracranial hemorrhage is seen.  CSF spaces: The ventricles, sulci and basal cisterns  all appear mildly prominent suggesting an element of global cerebral atrophy.  Brain parenchyma: There is preservation of the grey white junction throughout. Mild scattered microvascular change is seen in portions of the periventricular and deep white matter tracts.  Cerebellum: Unremarkable.  Sella and skull base: The sella appears to be within normal limits for age.  Intracranial calcifications: Incidental note is made of bilateral choroid plexus calcification. Incidental note is made of some pineal region calcification.  Calvarium: No acute linear or depressed skull fracture is seen.    Maxillofacial Structures: Maxillofacial findings discussed separately in the maxillofacial CT report.      Impression:  1. No acute intracranial process identified. Details and other findings as noted above.                          Preliminary result by Devyn Solano MD (01/04/23 00:34:34)                   Narrative:    START OF REPORT:  Technique: CT of the head was performed without intravenous contrast with axial as well as coronal and sagittal images.    Comparison: None.    Dosage Information: Automated Exposure Control was utilized 1061.62 mGy.cm.    Clinical history: Pt states he got up to go to bed and next thing he knew he was face first on the floor.    Findings:  Hemorrhage: No acute intracranial hemorrhage is seen.  CSF spaces: The ventricles, sulci and basal cisterns all appear mildly prominent suggesting an element of global cerebral atrophy.  Brain parenchyma: There is preservation of the grey white junction throughout. Mild scattered microvascular change is seen in portions of the periventricular and deep white matter tracts.  Cerebellum: Unremarkable.  Sella and skull base: The sella appears to be within normal limits for age.  Intracranial calcifications: Incidental note is made of bilateral choroid plexus calcification. Incidental note is made of some pineal region calcification.  Calvarium: No acute linear  or depressed skull fracture is seen.    Maxillofacial Structures: Maxillofacial findings discussed separately in the maxillofacial CT report.      Impression:  1. No acute intracranial process identified. Details and other findings as noted above.                                         X-Ray Chest AP Portable (Final result)  Result time 01/04/23 06:37:12      Final result by Ferdinand Gloria MD (01/04/23 06:37:12)                   Impression:      No acute cardiopulmonary process.      Electronically signed by: Ferdinand Gloria  Date:    01/04/2023  Time:    06:37               Narrative:    EXAMINATION:  XR CHEST AP PORTABLE    CLINICAL HISTORY:  chest pain;    TECHNIQUE:  Single view of the chest    COMPARISON:  05/02/2022    FINDINGS:  No focal opacification, pleural effusion, or pneumothorax.    The cardiomediastinal silhouette is within normal limits.    No acute osseous abnormality.                                       Medications   oxymetazoline 0.05 % nasal spray 3 spray (3 sprays Right Nostril Given 1/3/23 2236)   Tdap (BOOSTRIX) vaccine injection 0.5 mL (0.5 mLs Intramuscular Given 1/4/23 0111)   LIDOcaine 2%/EPINEPHrine 1:100,000 injection 1 mL (1 mL Intradermal Given 1/4/23 0115)     Medical Decision Making:   Initial Assessment:   Ambulatory without assistance. Bleeding briskly from laceration to the end of the nose and from his right nare  Differential Diagnosis:   Differential diagnosis includes, but is not limited to nasal fracture, nasal laceration, excessive, bleeding due to any coagulation, sinus fracture, intracranial injury, neck injury, potential for airway compromise due to brisk epistaxis, potential for hemorrhagic shock from blood loss  Clinical Tests:   Radiological Study: Reviewed  ED Management:  Direct pressure was held for quite some time to try to control the bleeding from his nose,  with repeated sprays of Neosynephrine and direct pressure. I did not attempt cauterized due to the  briskness of the bleed. I initially attempted to stop the bleeding with a rhino rocket, but it would not pass due to septal deviation. I then packed his right nare with Surgicel, which finally stopped the bleeding.  The laceration to the bridge of the nose was closed with glue and the laceration to the end of the nose was sutured.  CT scan of the face showed nasal fractures, CT scan of the head and neck were negative. Is mentation remained stable and airway patent. At discharge, he had no remaining bleeding, vital signs stable, not lightheaded, stated he felt fine. He will follow up with Dr Gates as an outpatient.  Other:   I have discussed this case with another health care provider.       <> Summary of the Discussion: Case discussed with Dr Nimesh Gates who rec he call the office in the AM for follow up.  No urgent intervention needed                        Clinical Impression:   Final diagnoses:  [R07.9] Chest pain  [S02.2XXB] Open fracture of nasal bone, initial encounter (Primary)        ED Disposition Condition    Discharge Stable          ED Prescriptions       Medication Sig Dispense Start Date End Date Auth. Provider    amoxicillin-clavulanate 875-125mg (AUGMENTIN) 875-125 mg per tablet Take 1 tablet by mouth 2 (two) times daily. 14 tablet 1/4/2023 -- Verenice Dailey MD          Follow-up Information       Follow up With Specialties Details Why Contact Info    Mike Gates MD Plastic Surgery Schedule an appointment as soon as possible for a visit   [address] [place] [address]  [address] [place] [id number]  [phone]               Verenice Dailey MD  01/04/23 6375

## 2023-01-05 ENCOUNTER — TELEPHONE (OUTPATIENT)
Dept: INTERNAL MEDICINE | Facility: CLINIC | Age: 73
End: 2023-01-05
Payer: MEDICARE

## 2023-01-05 NOTE — TELEPHONE ENCOUNTER
Patient called back stating to disregard previous message. He is following up with Dr. Kirkland to rule out cardiac issues for passing out. Will take care of packing.

## 2023-01-05 NOTE — TELEPHONE ENCOUNTER
Patient went to ER after passing out and hitting floor.  Broke his nose. He was referred to plastic surgery but they cannot see him until next week. Patient was instructed to remove packing on Friday.   Ok to wait until Tuesday or schedule patient for appointment here to be checked?    Please advise  [phone]

## 2023-01-06 DIAGNOSIS — E78.5 HYPERLIPIDEMIA, UNSPECIFIED HYPERLIPIDEMIA TYPE: ICD-10-CM

## 2023-01-06 DIAGNOSIS — I10 PRIMARY HYPERTENSION: Primary | ICD-10-CM

## 2023-01-06 RX ORDER — ATORVASTATIN CALCIUM 20 MG/1
TABLET, FILM COATED ORAL
Qty: 30 TABLET | Refills: 5 | Status: SHIPPED | OUTPATIENT
Start: 2023-01-06 | End: 2023-12-21 | Stop reason: SDUPTHER

## 2023-01-11 ENCOUNTER — LAB VISIT (OUTPATIENT)
Dept: LAB | Facility: HOSPITAL | Age: 73
End: 2023-01-11
Attending: INTERNAL MEDICINE
Payer: MEDICARE

## 2023-01-11 DIAGNOSIS — I48.21 PERMANENT ATRIAL FIBRILLATION: ICD-10-CM

## 2023-01-11 DIAGNOSIS — D64.9 ANEMIA, UNSPECIFIED TYPE: Primary | ICD-10-CM

## 2023-01-11 DIAGNOSIS — I10 PRIMARY HYPERTENSION: ICD-10-CM

## 2023-01-11 LAB
ANION GAP SERPL CALC-SCNC: 7 MEQ/L
BASOPHILS # BLD AUTO: 0.08 X10(3)/MCL (ref 0–0.2)
BASOPHILS NFR BLD AUTO: 1.3 %
BUN SERPL-MCNC: 11.8 MG/DL (ref 8.4–25.7)
CALCIUM SERPL-MCNC: 9.5 MG/DL (ref 8.8–10)
CHLORIDE SERPL-SCNC: 106 MMOL/L (ref 98–107)
CO2 SERPL-SCNC: 26 MMOL/L (ref 23–31)
CREAT SERPL-MCNC: 0.66 MG/DL (ref 0.73–1.18)
CREAT/UREA NIT SERPL: 18
EOSINOPHIL # BLD AUTO: 0.5 X10(3)/MCL (ref 0–0.9)
EOSINOPHIL NFR BLD AUTO: 8.1 %
ERYTHROCYTE [DISTWIDTH] IN BLOOD BY AUTOMATED COUNT: 12.7 % (ref 11.5–17)
FERRITIN SERPL-MCNC: 406.87 NG/ML (ref 21.81–274.66)
GFR SERPLBLD CREATININE-BSD FMLA CKD-EPI: >60 MLS/MIN/1.73/M2
GLUCOSE SERPL-MCNC: 108 MG/DL (ref 82–115)
HCT VFR BLD AUTO: 43.5 % (ref 42–52)
HGB BLD-MCNC: 15.1 GM/DL (ref 14–18)
IMM GRANULOCYTES # BLD AUTO: 0.01 X10(3)/MCL (ref 0–0.04)
IMM GRANULOCYTES NFR BLD AUTO: 0.2 %
IRON SATN MFR SERPL: 52 % (ref 20–50)
IRON SERPL-MCNC: 147 UG/DL (ref 65–175)
LYMPHOCYTES # BLD AUTO: 2.09 X10(3)/MCL (ref 0.6–4.6)
LYMPHOCYTES NFR BLD AUTO: 34 %
MAGNESIUM SERPL-MCNC: 1.6 MG/DL (ref 1.6–2.6)
MCH RBC QN AUTO: 32.9 PG
MCHC RBC AUTO-ENTMCNC: 34.7 MG/DL (ref 33–36)
MCV RBC AUTO: 94.8 FL (ref 80–94)
MONOCYTES # BLD AUTO: 0.83 X10(3)/MCL (ref 0.1–1.3)
MONOCYTES NFR BLD AUTO: 13.5 %
NEUTROPHILS # BLD AUTO: 2.63 X10(3)/MCL (ref 2.1–9.2)
NEUTROPHILS NFR BLD AUTO: 42.9 %
NRBC BLD AUTO-RTO: 0 %
PLATELET # BLD AUTO: 182 X10(3)/MCL (ref 130–400)
PMV BLD AUTO: 9.5 FL (ref 7.4–10.4)
POTASSIUM SERPL-SCNC: 4.1 MMOL/L (ref 3.5–5.1)
RBC # BLD AUTO: 4.59 X10(6)/MCL (ref 4.7–6.1)
SODIUM SERPL-SCNC: 139 MMOL/L (ref 136–145)
TIBC SERPL-MCNC: 134 UG/DL (ref 69–240)
TIBC SERPL-MCNC: 281 UG/DL (ref 250–450)
TROPONIN I SERPL-MCNC: 0.01 NG/ML (ref 0–0.04)
WBC # SPEC AUTO: 6.1 X10(3)/MCL (ref 4.5–11.5)

## 2023-01-11 PROCEDURE — 82728 ASSAY OF FERRITIN: CPT

## 2023-01-11 PROCEDURE — 80048 BASIC METABOLIC PNL TOTAL CA: CPT

## 2023-01-11 PROCEDURE — 85025 COMPLETE CBC W/AUTO DIFF WBC: CPT

## 2023-01-11 PROCEDURE — 83550 IRON BINDING TEST: CPT

## 2023-01-11 PROCEDURE — 83735 ASSAY OF MAGNESIUM: CPT

## 2023-01-11 PROCEDURE — 84484 ASSAY OF TROPONIN QUANT: CPT

## 2023-01-11 PROCEDURE — 36415 COLL VENOUS BLD VENIPUNCTURE: CPT

## 2023-01-30 DIAGNOSIS — M10.9 GOUT, UNSPECIFIED CAUSE, UNSPECIFIED CHRONICITY, UNSPECIFIED SITE: Primary | ICD-10-CM

## 2023-01-30 RX ORDER — ALLOPURINOL 300 MG/1
300 TABLET ORAL DAILY
Qty: 90 TABLET | Refills: 3 | Status: SHIPPED | OUTPATIENT
Start: 2023-01-30 | End: 2023-12-21 | Stop reason: SDUPTHER

## 2023-03-08 DIAGNOSIS — N52.9 ERECTILE DYSFUNCTION, UNSPECIFIED ERECTILE DYSFUNCTION TYPE: ICD-10-CM

## 2023-03-08 RX ORDER — SILDENAFIL 50 MG/1
50 TABLET, FILM COATED ORAL DAILY PRN
Qty: 30 TABLET | Refills: 3 | Status: SHIPPED | OUTPATIENT
Start: 2023-03-08 | End: 2024-04-03

## 2023-03-21 ENCOUNTER — OFFICE VISIT (OUTPATIENT)
Dept: INTERNAL MEDICINE | Facility: CLINIC | Age: 73
End: 2023-03-21
Payer: MEDICARE

## 2023-03-21 VITALS
DIASTOLIC BLOOD PRESSURE: 82 MMHG | WEIGHT: 273 LBS | HEIGHT: 76 IN | BODY MASS INDEX: 33.24 KG/M2 | HEART RATE: 96 BPM | SYSTOLIC BLOOD PRESSURE: 138 MMHG | RESPIRATION RATE: 20 BRPM | OXYGEN SATURATION: 97 %

## 2023-03-21 DIAGNOSIS — Z12.11 COLON CANCER SCREENING: ICD-10-CM

## 2023-03-21 DIAGNOSIS — I25.118 ATHEROSCLEROTIC HEART DISEASE OF NATIVE CORONARY ARTERY WITH OTHER FORMS OF ANGINA PECTORIS: ICD-10-CM

## 2023-03-21 DIAGNOSIS — I70.0 AORTIC ATHEROSCLEROSIS: ICD-10-CM

## 2023-03-21 DIAGNOSIS — E78.00 ELEVATED CHOLESTEROL: ICD-10-CM

## 2023-03-21 DIAGNOSIS — I27.29 OTHER SECONDARY PULMONARY HYPERTENSION: Primary | ICD-10-CM

## 2023-03-21 DIAGNOSIS — R55 SYNCOPE, UNSPECIFIED SYNCOPE TYPE: ICD-10-CM

## 2023-03-21 DIAGNOSIS — I73.9 CLAUDICATION IN PERIPHERAL VASCULAR DISEASE: ICD-10-CM

## 2023-03-21 DIAGNOSIS — I48.11 LONGSTANDING PERSISTENT ATRIAL FIBRILLATION: ICD-10-CM

## 2023-03-21 PROCEDURE — 99214 PR OFFICE/OUTPT VISIT, EST, LEVL IV, 30-39 MIN: ICD-10-PCS | Mod: ,,, | Performed by: INTERNAL MEDICINE

## 2023-03-21 PROCEDURE — 99214 OFFICE O/P EST MOD 30 MIN: CPT | Mod: ,,, | Performed by: INTERNAL MEDICINE

## 2023-03-21 RX ORDER — DIGOXIN 125 MCG
1 TABLET ORAL DAILY
COMMUNITY
Start: 2023-02-07 | End: 2023-12-21 | Stop reason: SDUPTHER

## 2023-03-21 NOTE — PROGRESS NOTES
Subjective:       Patient ID: Adam Jean Baptiste is a 72 y.o. male.    Chief Complaint: Follow-up (6 mo-review labs 1/11/23) and Fall (Fall in January-stood up and passed up,broke nose, Went to ER)    HPI  72-year male history AFib, atherosclerotic, hypertension, here on follow-up who refers back in January after being sitting in the recliner for a long time watching a TV sharp got up and had total syncope.  Patient went to emergency room after he woke up with a pool of blood and resulted in a broken maxillary bones, CT of the head and maxillary sinus shows the fracture, patient was sent home.  After that he had seen his cardiologist Dr. Kirkland who has done carotid ultrasound echo etc. would like to get release information from that.  After further interviewing the patient years back he happened twice in the same they usually always related to lower extremity issue reason I will go ahead and do an ultrasound of the lower extremities.    No chest pain no amaurosis fugax, he recalls getting out of the recliner and next thing he knows he is on the floor  Denies any recent  Review of Systems    Diarrhea fever or chills   Pertinent for full syncope, CT of the head was normal CT of the maxillary confirms the fracture  Again no chest pain no palpitation no amaurosis fugax no acute neurological deficits   No fever chills or flu-like symptoms   Denies nausea vomiting diarrhea constipation  Denies dysuria urgency frequency or nocturia     Objective:      Physical Exam  patient alert oriented x3   HEENT thick short neck no JVD   Heart irregular irregular systolic murmur 2/6   Lungs are clear bilaterally   Abdomen benign   Extremities trace edema  Assessment:       1. Syncope, unspecified syncope type    2. Other secondary pulmonary hypertension    3. Atherosclerotic heart disease of native coronary artery with other forms of angina pectoris    4. Longstanding persistent atrial fibrillation    5. Aortic atherosclerosis    6.  Elevated cholesterol      Plan:       Release info from Dr Kirkland Echo  Carotid  US  NIVA  bilateral   Cpm and  Rx  stable   Continue  meds   As per Cardiologist  no chest pain   Continue  meds

## 2023-04-06 ENCOUNTER — TELEPHONE (OUTPATIENT)
Dept: INTERNAL MEDICINE | Facility: CLINIC | Age: 73
End: 2023-04-06
Payer: MEDICARE

## 2023-04-06 NOTE — TELEPHONE ENCOUNTER
----- Message from Omar Donahue MD sent at 3/29/2023  1:23 PM CDT -----  Please call patient  with normal results , normal circulation

## 2023-05-01 ENCOUNTER — TELEPHONE (OUTPATIENT)
Dept: INTERNAL MEDICINE | Facility: CLINIC | Age: 73
End: 2023-05-01
Payer: MEDICARE

## 2023-05-01 ENCOUNTER — PATIENT MESSAGE (OUTPATIENT)
Dept: ADMINISTRATIVE | Facility: HOSPITAL | Age: 73
End: 2023-05-01
Payer: MEDICARE

## 2023-05-01 NOTE — TELEPHONE ENCOUNTER
Appt Notification:    Shanell with Dr. Webster's office called to advise that patient referral was received in March, patient was not ready to schedule, will contact office once he is ready.

## 2023-07-24 ENCOUNTER — PATIENT MESSAGE (OUTPATIENT)
Dept: ADMINISTRATIVE | Facility: HOSPITAL | Age: 73
End: 2023-07-24
Payer: MEDICARE

## 2023-08-01 ENCOUNTER — PATIENT OUTREACH (OUTPATIENT)
Dept: ADMINISTRATIVE | Facility: HOSPITAL | Age: 73
End: 2023-08-01
Payer: MEDICARE

## 2023-08-01 NOTE — PROGRESS NOTES
Population Health Outreach.    8/1/23 Patient was called for Colonoscopy but he has to reschedule for a later day. He will call to reschedule.

## 2023-09-19 DIAGNOSIS — Z00.00 WELLNESS EXAMINATION: ICD-10-CM

## 2023-09-19 DIAGNOSIS — I10 PRIMARY HYPERTENSION: ICD-10-CM

## 2023-09-19 DIAGNOSIS — E78.2 MIXED HYPERLIPIDEMIA: Primary | ICD-10-CM

## 2023-09-20 ENCOUNTER — TELEPHONE (OUTPATIENT)
Dept: INTERNAL MEDICINE | Facility: CLINIC | Age: 73
End: 2023-09-20
Payer: MEDICARE

## 2023-09-20 NOTE — TELEPHONE ENCOUNTER
"----- Message from Valeria Garcia LPN sent at 9/19/2023 12:50 PM CDT -----  Regarding: Wellness 09/27/2023 1000  Are there any outstanding tasks in chart? No, but needs FASTING labs, TO BE DONE AT  "[facility]" or lab location of choice PRIOR to appt    Is there any documentation of tasks? no    Has the pt seen another physician, been to ER, [facility], or admitted to hospital since last visit?    Has the pt done blood work or imaging since last visit?    5. PLEASE HAVE PATIENT BRING MEDICATION LIST OR BOTTLES TO EVERY OFFICE VISIT     "

## 2023-09-29 ENCOUNTER — LAB VISIT (OUTPATIENT)
Dept: LAB | Facility: HOSPITAL | Age: 73
End: 2023-09-29
Attending: INTERNAL MEDICINE
Payer: MEDICARE

## 2023-09-29 DIAGNOSIS — I10 PRIMARY HYPERTENSION: ICD-10-CM

## 2023-09-29 DIAGNOSIS — E78.2 MIXED HYPERLIPIDEMIA: ICD-10-CM

## 2023-09-29 DIAGNOSIS — Z00.00 WELLNESS EXAMINATION: ICD-10-CM

## 2023-09-29 LAB
ALBUMIN SERPL-MCNC: 3.7 G/DL (ref 3.4–4.8)
ALBUMIN/GLOB SERPL: 1 RATIO (ref 1.1–2)
ALP SERPL-CCNC: 46 UNIT/L (ref 40–150)
ALT SERPL-CCNC: 31 UNIT/L (ref 0–55)
AST SERPL-CCNC: 31 UNIT/L (ref 5–34)
BASOPHILS # BLD AUTO: 0.08 X10(3)/MCL
BASOPHILS NFR BLD AUTO: 1.1 %
BILIRUB SERPL-MCNC: 0.6 MG/DL
BUN SERPL-MCNC: 11.1 MG/DL (ref 8.4–25.7)
CALCIUM SERPL-MCNC: 9 MG/DL (ref 8.8–10)
CHLORIDE SERPL-SCNC: 108 MMOL/L (ref 98–107)
CHOLEST SERPL-MCNC: 174 MG/DL
CHOLEST/HDLC SERPL: 4 {RATIO} (ref 0–5)
CO2 SERPL-SCNC: 25 MMOL/L (ref 23–31)
CREAT SERPL-MCNC: 0.77 MG/DL (ref 0.73–1.18)
EOSINOPHIL # BLD AUTO: 0.2 X10(3)/MCL (ref 0–0.9)
EOSINOPHIL NFR BLD AUTO: 2.8 %
ERYTHROCYTE [DISTWIDTH] IN BLOOD BY AUTOMATED COUNT: 12.8 % (ref 11.5–17)
GFR SERPLBLD CREATININE-BSD FMLA CKD-EPI: >60 MLS/MIN/1.73/M2
GLOBULIN SER-MCNC: 3.8 GM/DL (ref 2.4–3.5)
GLUCOSE SERPL-MCNC: 109 MG/DL (ref 82–115)
HCT VFR BLD AUTO: 46.5 % (ref 42–52)
HDLC SERPL-MCNC: 46 MG/DL (ref 35–60)
HGB BLD-MCNC: 16.1 G/DL (ref 14–18)
IMM GRANULOCYTES # BLD AUTO: 0.02 X10(3)/MCL (ref 0–0.04)
IMM GRANULOCYTES NFR BLD AUTO: 0.3 %
LDLC SERPL CALC-MCNC: 93 MG/DL (ref 50–140)
LYMPHOCYTES # BLD AUTO: 2.27 X10(3)/MCL (ref 0.6–4.6)
LYMPHOCYTES NFR BLD AUTO: 31.7 %
MCH RBC QN AUTO: 33.1 PG (ref 27–31)
MCHC RBC AUTO-ENTMCNC: 34.6 G/DL (ref 33–36)
MCV RBC AUTO: 95.5 FL (ref 80–94)
MONOCYTES # BLD AUTO: 0.76 X10(3)/MCL (ref 0.1–1.3)
MONOCYTES NFR BLD AUTO: 10.6 %
NEUTROPHILS # BLD AUTO: 3.83 X10(3)/MCL (ref 2.1–9.2)
NEUTROPHILS NFR BLD AUTO: 53.5 %
NRBC BLD AUTO-RTO: 0 %
PLATELET # BLD AUTO: 155 X10(3)/MCL (ref 130–400)
PMV BLD AUTO: 9.8 FL (ref 7.4–10.4)
POTASSIUM SERPL-SCNC: 4 MMOL/L (ref 3.5–5.1)
PROT SERPL-MCNC: 7.5 GM/DL (ref 5.8–7.6)
RBC # BLD AUTO: 4.87 X10(6)/MCL (ref 4.7–6.1)
SODIUM SERPL-SCNC: 143 MMOL/L (ref 136–145)
TRIGL SERPL-MCNC: 175 MG/DL (ref 34–140)
TSH SERPL-ACNC: 1.32 UIU/ML (ref 0.35–4.94)
VLDLC SERPL CALC-MCNC: 35 MG/DL
WBC # SPEC AUTO: 7.16 X10(3)/MCL (ref 4.5–11.5)

## 2023-09-29 PROCEDURE — 80061 LIPID PANEL: CPT

## 2023-09-29 PROCEDURE — 80053 COMPREHEN METABOLIC PANEL: CPT

## 2023-09-29 PROCEDURE — 84443 ASSAY THYROID STIM HORMONE: CPT

## 2023-09-29 PROCEDURE — 85025 COMPLETE CBC W/AUTO DIFF WBC: CPT

## 2023-09-29 PROCEDURE — 36415 COLL VENOUS BLD VENIPUNCTURE: CPT

## 2023-10-03 ENCOUNTER — OFFICE VISIT (OUTPATIENT)
Dept: INTERNAL MEDICINE | Facility: CLINIC | Age: 73
End: 2023-10-03
Payer: MEDICARE

## 2023-10-03 VITALS
HEART RATE: 100 BPM | BODY MASS INDEX: 33.65 KG/M2 | OXYGEN SATURATION: 97 % | DIASTOLIC BLOOD PRESSURE: 82 MMHG | HEIGHT: 75 IN | WEIGHT: 270.63 LBS | SYSTOLIC BLOOD PRESSURE: 130 MMHG

## 2023-10-03 DIAGNOSIS — Z00.00 ANNUAL PHYSICAL EXAM: Primary | ICD-10-CM

## 2023-10-03 DIAGNOSIS — E78.00 ELEVATED CHOLESTEROL: ICD-10-CM

## 2023-10-03 PROCEDURE — G0439 PPPS, SUBSEQ VISIT: HCPCS | Mod: ,,, | Performed by: INTERNAL MEDICINE

## 2023-10-03 PROCEDURE — G0439 PR MEDICARE ANNUAL WELLNESS SUBSEQUENT VISIT: ICD-10-PCS | Mod: ,,, | Performed by: INTERNAL MEDICINE

## 2023-10-03 NOTE — PROGRESS NOTES
Patient ID: Adam Jean Baptiste is a 73 y.o. male.    Chief Complaint: Medicare AWV      Patient Care Team:  Omar Donahue MD as PCP - General (Internal Medicine)  Omar Donahue MD Noel, Jacque F III, MD as Consulting Physician (Gastroenterology)     HPI:   Patient presents here today for above reason.     To discuss blood work  , has  lili Dr Kirkland    heart stable    The patient's Health Maintenance was reviewed and the following appears to be due at this time:   Health Maintenance Due   Topic Date Due    Colorectal Cancer Screening  Never done    Shingles Vaccine (1 of 2) Never done    Pneumococcal Vaccines (Age 65+) (2 - PPSV23 or PCV20) 09/10/2020    COVID-19 Vaccine (4 - Pfizer series) 02/16/2022    Influenza Vaccine (1) 09/01/2023        Past Medical History:  Past Medical History:   Diagnosis Date    Gout, unspecified     HLD (hyperlipidemia)     HTN (hypertension)     Paroxysmal atrial fibrillation      Past Surgical History:   Procedure Laterality Date    ADENOIDECTOMY      APPENDECTOMY      COLONOSCOPY  12/06/2006    LAPAROSCOPIC CHOLECYSTECTOMY      TONSILLECTOMY       Review of patient's allergies indicates:  No Known Allergies  Current Outpatient Medications on File Prior to Visit   Medication Sig Dispense Refill    allopurinoL (ZYLOPRIM) 300 MG tablet Take 1 tablet (300 mg total) by mouth once daily. 90 tablet 3    atorvastatin (LIPITOR) 20 MG tablet TAKE 1 TABLET BY MOUTH DAILY 30 tablet 5    co-enzyme Q-10 30 mg capsule Take 30 mg by mouth 3 (three) times daily.      digoxin (LANOXIN) 125 mcg tablet Take 1 tablet by mouth once daily.      multivitamin capsule Take 1 capsule by mouth once daily.      rivaroxaban (XARELTO) 20 mg Tab Take 20 mg by mouth daily with dinner or evening meal.      sildenafiL (VIAGRA) 50 MG tablet Take 1 tablet (50 mg total) by mouth daily as needed for Erectile Dysfunction. 30 tablet 3    zinc gluconate 50 mg tablet Take 50 mg by mouth once daily.       No current  "facility-administered medications on file prior to visit.     Social History     Socioeconomic History    Marital status:    Tobacco Use    Smoking status: Never    Smokeless tobacco: Never   Substance and Sexual Activity    Alcohol use: Yes     History reviewed. No pertinent family history.    ROS:   Review of Systems  Constitutional: No weight gain, No fever, No chills, No fatigue.   Eyes: No blurring, No visual disturbances.   Ear/Nose/Mouth/Throat: No decreased hearing, No ear pain, No nasal congestion, No sore throat.   Respiratory: No shortness of breath, No cough, No wheezing.   Cardiovascular: No chest pain, No palpitations, No peripheral edema.   Gastrointestinal: No nausea, No vomiting, No diarrhea, No constipation, No abdominal pain.   Genitourinary: No dysuria, No hematuria.   Hematology/Lymphatics: No bruising tendency, No bleeding tendency, No swollen lymph glands.   Endocrine: No excessive thirst, No polyuria, No excessive hunger.   Musculoskeletal: No joint pain, No muscle pain, No decreased range of motion.   Integumentary: No rash, No pruritus.   Neurologic: No abnormal balance, No confusion, No headache.   Psychiatric: No anxiety, No depression, Not suicidal, No hallucinations.        Patient Reported Health Risk Assessment         Vitals/PE:   /82 (BP Location: Left arm, Patient Position: Sitting, BP Method: Medium (Manual))   Pulse 100   Ht 6' 3" (1.905 m)   Wt 122.7 kg (270 lb 9.6 oz)   SpO2 97%   BMI 33.82 kg/m²   Physical Exam    General: Alert and oriented, No acute distress.   Eye: Normal conjunctiva without exudate.  HENMT: Normocephalic/AT, Normal hearing, Oral mucosa is moist and pink   Neck: No goiter visualized.   Respiratory: Lungs CTAB, Respirations are non-labored, Breath sounds are equal, Symmetrical chest wall expansion.  Cardiovascular: Normal rate, Regular rhythm, No murmur, No edema.   Gastrointestinal: Non-distended.   Genitourinary: " Deferred.  Musculoskeletal: Normal ROM, Normal gait, No deformities or amputations.  Integumentary: Warm, Dry, Intact. No diaphoresis, or flushing.  Neurologic: No focal deficits, Cranial Nerves II-XII are grossly intact.   Psychiatric: Cooperative, Appropriate mood & affect, Normal judgment, Non-suicidal.         No data to display                  10/3/2023     2:00 PM 3/21/2023     9:40 AM 9/19/2022    10:00 AM   Fall Risk Assessment - Outpatient   Mobility Status Ambulatory Ambulatory Ambulatory   Number of falls 0 1 with injury 0   Identified as fall risk False True False                Assessment/Plan:   ..  Problem List Items Addressed This Visit          Cardiac/Vascular    Elevated cholesterol       Other    Annual physical exam - Primary      Recommendations:  Diet (healthy food choices, reduce portions and overall calorie intake)  Exercise 30-45 minutes at least 3x per week  Avoid excessive alcohol intake and tobacco use  Stay UTD with immunizations and preventative screenings   Yearly Labs     ..    Patient educated on increase water intake ,  weight loss , low  carb diet   and  start exercising       ..No orders of the defined types were placed in this encounter.        I am having Adam Jean Baptiste maintain his rivaroxaban, multivitamin, co-enzyme Q-10, zinc gluconate, atorvastatin, allopurinoL, sildenafiL, and digoxin.    No orders of the defined types were placed in this encounter.      Education and counseling done face to face regarding medical conditions and plan. Contact office if new symptoms develop. Should any symptoms ever significantly worsen seek emergency medical attention/go to ER. Follow up at least yearly for wellness or sooner PRN. Nurse to call patient with any results. The patient is receptive, expresses understanding and is agreeable to plan. All questions have been answered.          Advance Care Planning   I attest to discussing Advance Care Planning with patient and/or family  member.  Education was provided including the importance of the Health Care Power of , Advance Directives, and/or [place] documentation.  The patient expressed understanding to the importance of this information and discussion.           Follow up in about 6 months (around 4/3/2024).

## 2023-10-25 ENCOUNTER — PATIENT MESSAGE (OUTPATIENT)
Dept: ADMINISTRATIVE | Facility: HOSPITAL | Age: 73
End: 2023-10-25
Payer: MEDICARE

## 2023-12-21 DIAGNOSIS — I25.118 ATHEROSCLEROTIC HEART DISEASE OF NATIVE CORONARY ARTERY WITH OTHER FORMS OF ANGINA PECTORIS: Primary | ICD-10-CM

## 2023-12-21 DIAGNOSIS — M10.9 GOUT, UNSPECIFIED CAUSE, UNSPECIFIED CHRONICITY, UNSPECIFIED SITE: ICD-10-CM

## 2023-12-21 DIAGNOSIS — E78.5 HYPERLIPIDEMIA, UNSPECIFIED HYPERLIPIDEMIA TYPE: ICD-10-CM

## 2023-12-21 RX ORDER — DIGOXIN 125 MCG
0.12 TABLET ORAL DAILY
Qty: 90 TABLET | Refills: 3 | Status: SHIPPED | OUTPATIENT
Start: 2023-12-21 | End: 2024-04-03

## 2023-12-21 RX ORDER — ALLOPURINOL 300 MG/1
300 TABLET ORAL DAILY
Qty: 90 TABLET | Refills: 3 | Status: SHIPPED | OUTPATIENT
Start: 2023-12-21

## 2023-12-21 RX ORDER — ATORVASTATIN CALCIUM 20 MG/1
20 TABLET, FILM COATED ORAL DAILY
Qty: 90 TABLET | Refills: 3 | Status: SHIPPED | OUTPATIENT
Start: 2023-12-21 | End: 2024-04-03

## 2024-01-08 PROBLEM — Z00.00 ANNUAL PHYSICAL EXAM: Status: RESOLVED | Noted: 2023-10-03 | Resolved: 2024-01-08

## 2024-02-01 ENCOUNTER — OFFICE VISIT (OUTPATIENT)
Dept: INTERNAL MEDICINE | Facility: CLINIC | Age: 74
End: 2024-02-01
Payer: MEDICARE

## 2024-02-01 VITALS
WEIGHT: 268.19 LBS | BODY MASS INDEX: 33.35 KG/M2 | HEIGHT: 75 IN | DIASTOLIC BLOOD PRESSURE: 84 MMHG | HEART RATE: 67 BPM | SYSTOLIC BLOOD PRESSURE: 126 MMHG | OXYGEN SATURATION: 97 %

## 2024-02-01 DIAGNOSIS — T46.6X5A MYALGIA DUE TO STATIN: ICD-10-CM

## 2024-02-01 DIAGNOSIS — M79.10 MYALGIA DUE TO STATIN: ICD-10-CM

## 2024-02-01 DIAGNOSIS — M25.50 ARTHRALGIA, UNSPECIFIED JOINT: Primary | ICD-10-CM

## 2024-02-01 PROCEDURE — 1160F RVW MEDS BY RX/DR IN RCRD: CPT | Mod: CPTII,,, | Performed by: INTERNAL MEDICINE

## 2024-02-01 PROCEDURE — 3079F DIAST BP 80-89 MM HG: CPT | Mod: CPTII,,, | Performed by: INTERNAL MEDICINE

## 2024-02-01 PROCEDURE — 3074F SYST BP LT 130 MM HG: CPT | Mod: CPTII,,, | Performed by: INTERNAL MEDICINE

## 2024-02-01 PROCEDURE — 1125F AMNT PAIN NOTED PAIN PRSNT: CPT | Mod: CPTII,,, | Performed by: INTERNAL MEDICINE

## 2024-02-01 PROCEDURE — 96372 THER/PROPH/DIAG INJ SC/IM: CPT | Mod: ,,, | Performed by: INTERNAL MEDICINE

## 2024-02-01 PROCEDURE — 1101F PT FALLS ASSESS-DOCD LE1/YR: CPT | Mod: CPTII,,, | Performed by: INTERNAL MEDICINE

## 2024-02-01 PROCEDURE — 1159F MED LIST DOCD IN RCRD: CPT | Mod: CPTII,,, | Performed by: INTERNAL MEDICINE

## 2024-02-01 PROCEDURE — 99214 OFFICE O/P EST MOD 30 MIN: CPT | Mod: 25,,, | Performed by: INTERNAL MEDICINE

## 2024-02-01 PROCEDURE — 3288F FALL RISK ASSESSMENT DOCD: CPT | Mod: CPTII,,, | Performed by: INTERNAL MEDICINE

## 2024-02-01 RX ORDER — DEXAMETHASONE 4 MG/1
4 TABLET ORAL EVERY 12 HOURS
Qty: 20 TABLET | Refills: 0 | Status: SHIPPED | OUTPATIENT
Start: 2024-02-01 | End: 2024-02-01 | Stop reason: CLARIF

## 2024-02-01 RX ORDER — DEXAMETHASONE SODIUM PHOSPHATE 4 MG/ML
4 INJECTION, SOLUTION INTRA-ARTICULAR; INTRALESIONAL; INTRAMUSCULAR; INTRAVENOUS; SOFT TISSUE
Status: COMPLETED | OUTPATIENT
Start: 2024-02-01 | End: 2024-02-01

## 2024-02-01 RX ADMIN — DEXAMETHASONE SODIUM PHOSPHATE 4 MG: 4 INJECTION, SOLUTION INTRA-ARTICULAR; INTRALESIONAL; INTRAMUSCULAR; INTRAVENOUS; SOFT TISSUE at 03:02

## 2024-02-01 NOTE — PROGRESS NOTES
Patient ID: Adam Jean Baptiste is a 73 y.o. male.  Chief Complaint: Headache (Joint pain. Pain increase with any type of excertion.right shoulder and wrist area and left ankle. )    HPI:   74 yo male  with diffuse myalgias  , arthralgias  ,  since  lifting heavy objects , doing work outdoors.   Refers  morning stiffness , relief  after movements . Chart reviewed  on statin , advice to hold  for 2 weeks  ,  we will give  steroid  shoy     Current Outpatient Medications:     allopurinoL (ZYLOPRIM) 300 MG tablet, Take 1 tablet (300 mg total) by mouth once daily., Disp: 90 tablet, Rfl: 3    atorvastatin (LIPITOR) 20 MG tablet, Take 1 tablet (20 mg total) by mouth once daily., Disp: 90 tablet, Rfl: 3    co-enzyme Q-10 30 mg capsule, Take 30 mg by mouth 3 (three) times daily., Disp: , Rfl:     digoxin (LANOXIN) 125 mcg tablet, Take 1 tablet (0.125 mg total) by mouth once daily., Disp: 90 tablet, Rfl: 3    multivitamin capsule, Take 1 capsule by mouth once daily., Disp: , Rfl:     rivaroxaban (XARELTO) 20 mg Tab, Take 20 mg by mouth daily with dinner or evening meal., Disp: , Rfl:     sildenafiL (VIAGRA) 50 MG tablet, Take 1 tablet (50 mg total) by mouth daily as needed for Erectile Dysfunction., Disp: 30 tablet, Rfl: 3    zinc gluconate 50 mg tablet, Take 50 mg by mouth once daily., Disp: , Rfl:   ROS:   Constitutional: No weight gain, No fever, No chills, No fatigue.   Eyes: No blurring, No visual disturbances.   Ear/Nose/Mouth/Throat: No decreased hearing, No ear pain, No nasal congestion, No sore throat.   Respiratory: No shortness of breath, No cough, No wheezing.   Cardiovascular: No chest pain, No palpitations, No peripheral edema.   Gastrointestinal: No nausea, No vomiting, No diarrhea, No constipation, No abdominal pain.   Genitourinary: No dysuria, No hematuria.   Hematology/Lymphatics: No bruising tendency, No bleeding tendency, No swollen lymph glands.   Endocrine: No excessive thirst, No polyuria, No  "excessive hunger.   Musculoskeletal: diffuse  joint pain, diffuse  muscle pain, No decreased range of motion.   Integumentary: No rash, No pruritus.   Neurologic: No abnormal balance, No confusion, No headache.   Psychiatric: No anxiety, No depression, Not suicidal, No hallucinations.    PE/Vitals:   /84 (BP Location: Left arm, Patient Position: Sitting, BP Method: Medium (Manual))   Pulse 67   Ht 6' 3" (1.905 m)   Wt 121.7 kg (268 lb 3.2 oz)   SpO2 97%   BMI 33.52 kg/m²   General: Alert and oriented, No acute distress.   Eye: Normal conjunctiva without exudate.  HENMT: Normocephalic/AT, Normal hearing, Oral mucosa is moist and pink   Neck: No goiter visualized.   Respiratory: Lungs CTAB, Respirations are non-labored, Breath sounds are equal, Symmetrical chest wall expansion.  Cardiovascular: Normal rate, Regular rhythm, No murmur, No edema.   Gastrointestinal: Non-distended.   Genitourinary: Deferred.  Musculoskeletal: Normal ROM, Normal gait, No deformities or amputations.  Integumentary: Warm, Dry, Intact. No diaphoresis, or flushing.  Neurologic: No focal deficits, Cranial Nerves II-XII are grossly intact.   Psychiatric: Cooperative, Appropriate mood & affect, Normal judgment, Non-suicidal.  Assessment/Plan   1. Arthralgia, unspecified joint  Comments:  hold  lipitor    2. Myalgia due to statin  Comments:  hold  statin      No orders of the defined types were placed in this encounter.    Education and counseling done face to face regarding medical conditions and plan. Contact office if new symptoms develop. Should any symptoms ever significantly worsen seek emergency medical attention/go to ER. Follow up at least yearly for wellness or sooner PRN. Nurse to call patient with any results. The patient is receptive, expresses understanding and is agreeable to plan. All questions have been answered.  No follow-ups on file.      "

## 2024-03-21 ENCOUNTER — PATIENT MESSAGE (OUTPATIENT)
Dept: ADMINISTRATIVE | Facility: OTHER | Age: 74
End: 2024-03-21
Payer: MEDICARE

## 2024-04-03 ENCOUNTER — OFFICE VISIT (OUTPATIENT)
Dept: INTERNAL MEDICINE | Facility: CLINIC | Age: 74
End: 2024-04-03
Payer: MEDICARE

## 2024-04-03 VITALS
SYSTOLIC BLOOD PRESSURE: 138 MMHG | WEIGHT: 273 LBS | HEIGHT: 75 IN | HEART RATE: 79 BPM | BODY MASS INDEX: 33.94 KG/M2 | DIASTOLIC BLOOD PRESSURE: 86 MMHG | OXYGEN SATURATION: 98 %

## 2024-04-03 DIAGNOSIS — M25.561 ACUTE PAIN OF RIGHT KNEE: ICD-10-CM

## 2024-04-03 DIAGNOSIS — I25.118 ATHEROSCLEROTIC HEART DISEASE OF NATIVE CORONARY ARTERY WITH OTHER FORMS OF ANGINA PECTORIS: ICD-10-CM

## 2024-04-03 DIAGNOSIS — M10.9 GOUT, UNSPECIFIED CAUSE, UNSPECIFIED CHRONICITY, UNSPECIFIED SITE: ICD-10-CM

## 2024-04-03 DIAGNOSIS — I70.0 AORTIC ATHEROSCLEROSIS: ICD-10-CM

## 2024-04-03 DIAGNOSIS — E78.2 MIXED HYPERLIPIDEMIA: Primary | ICD-10-CM

## 2024-04-03 DIAGNOSIS — I48.11 LONGSTANDING PERSISTENT ATRIAL FIBRILLATION: ICD-10-CM

## 2024-04-03 PROBLEM — I27.29 OTHER SECONDARY PULMONARY HYPERTENSION: Status: RESOLVED | Noted: 2023-03-21 | Resolved: 2024-04-03

## 2024-04-03 PROCEDURE — 3288F FALL RISK ASSESSMENT DOCD: CPT | Mod: CPTII,,, | Performed by: INTERNAL MEDICINE

## 2024-04-03 PROCEDURE — 1126F AMNT PAIN NOTED NONE PRSNT: CPT | Mod: CPTII,,, | Performed by: INTERNAL MEDICINE

## 2024-04-03 PROCEDURE — 1101F PT FALLS ASSESS-DOCD LE1/YR: CPT | Mod: CPTII,,, | Performed by: INTERNAL MEDICINE

## 2024-04-03 PROCEDURE — 1160F RVW MEDS BY RX/DR IN RCRD: CPT | Mod: CPTII,,, | Performed by: INTERNAL MEDICINE

## 2024-04-03 PROCEDURE — 1159F MED LIST DOCD IN RCRD: CPT | Mod: CPTII,,, | Performed by: INTERNAL MEDICINE

## 2024-04-03 PROCEDURE — 3075F SYST BP GE 130 - 139MM HG: CPT | Mod: CPTII,,, | Performed by: INTERNAL MEDICINE

## 2024-04-03 PROCEDURE — 3079F DIAST BP 80-89 MM HG: CPT | Mod: CPTII,,, | Performed by: INTERNAL MEDICINE

## 2024-04-03 PROCEDURE — 99214 OFFICE O/P EST MOD 30 MIN: CPT | Mod: ,,, | Performed by: INTERNAL MEDICINE

## 2024-04-03 NOTE — PROGRESS NOTES
Patient ID: Adam Jean Baptiste is a 73 y.o. male.  Chief Complaint: Follow-up (6 month )    HPI:     73-year-old male AFib, arteriosclerotic coronary heart disease, gout, recently underwent cataract surgery by Dr. Izaguirre doing well.  Concerned about right knee pain, he had surgery years ago on the left by Dr. Anton Olguin patient advised to contact his office for x-ray and the next plan of care   Patient last visit was experiencing severe myalgias and arthralgias secondary to the statins, intolerance reason we had decided to stop and all symptoms have resolved.  With that said will go ahead and repeat a lipid panel on Friday to determine if patient needs to take it daily every other day or to change to other options of medication       Current Outpatient Medications:     allopurinoL (ZYLOPRIM) 300 MG tablet, Take 1 tablet (300 mg total) by mouth once daily., Disp: 90 tablet, Rfl: 3    co-enzyme Q-10 30 mg capsule, Take 30 mg by mouth 3 (three) times daily., Disp: , Rfl:     multivitamin capsule, Take 1 capsule by mouth once daily., Disp: , Rfl:     rivaroxaban (XARELTO) 20 mg Tab, Take 20 mg by mouth daily with dinner or evening meal., Disp: , Rfl:     sildenafiL (VIAGRA) 50 MG tablet, Take 1 tablet (50 mg total) by mouth daily as needed for Erectile Dysfunction., Disp: 30 tablet, Rfl: 3    zinc gluconate 50 mg tablet, Take 50 mg by mouth once daily., Disp: , Rfl:   ROS:   Constitutional: No weight gain, No fever, No chills, No fatigue.   Eyes: No blurring, No visual disturbances.   Ear/Nose/Mouth/Throat: No decreased hearing, No ear pain, No nasal congestion, No sore throat.   Respiratory: No shortness of breath, No cough, No wheezing.   Cardiovascular: No chest pain, No palpitations, No peripheral edema.   Gastrointestinal: No nausea, No vomiting, No diarrhea, No constipation, No abdominal pain.   Genitourinary: No dysuria, No hematuria.   Hematology/Lymphatics: No bruising tendency, No bleeding tendency,  "No swollen lymph glands.   Endocrine: No excessive thirst, No polyuria, No excessive hunger.   Musculoskeletal:  right knee  joint pain, No muscle pain, No decreased range of motion.   Integumentary: No rash, No pruritus.   Neurologic: No abnormal balance, No confusion, No headache.   Psychiatric: No anxiety, No depression, Not suicidal, No hallucinations.    PE/Vitals:   /86 (BP Location: Left arm, Patient Position: Sitting, BP Method: Medium (Manual))   Pulse 79   Ht 6' 3" (1.905 m)   Wt 123.8 kg (273 lb)   SpO2 98%   BMI 34.12 kg/m²   General: Alert and oriented, No acute distress.   Eye: Normal conjunctiva without exudate.  HENMT: Normocephalic/AT, Normal hearing, Oral mucosa is moist and pink   Neck: No goiter visualized.   Respiratory: Lungs CTAB, Respirations are non-labored, Breath sounds are equal, Symmetrical chest wall expansion.  Cardiovascular: Normal rate, Regular rhythm, No murmur, No edema.   Gastrointestinal: Non-distended.   Genitourinary: Deferred.  Musculoskeletal: Normal ROM, Normal gait, No deformities or amputations.  Integumentary: Warm, Dry, Intact. No diaphoresis, or flushing.  Neurologic: No focal deficits, Cranial Nerves II-XII are grossly intact.   Psychiatric: Cooperative, Appropriate mood & affect, Normal judgment, Non-suicidal.  Assessment/Plan   1. Mixed hyperlipidemia  Comments:  repaet lipid panel  Orders:  -     Cancel: Lipid Panel; Future; Expected date: 04/04/2024  -     Lipid Panel; Future; Expected date: 04/05/2024    2. Longstanding persistent atrial fibrillation  Comments:  on xarelto    3. Atherosclerotic heart disease of native coronary artery with other forms of angina pectoris  Comments:  stable  no angina    4. Aortic atherosclerosis  Comments:  on xarelto    5. Gout, unspecified cause, unspecified chronicity, unspecified site  Comments:  stable on allopurinol  will check uric acid    6. Acute pain of right knee      Orders Placed This Encounter   Procedures "    Lipid Panel     Standing Status:   Future     Standing Expiration Date:   4/3/2025     Education and counseling done face to face regarding medical conditions and plan. Contact office if new symptoms develop. Should any symptoms ever significantly worsen seek emergency medical attention/go to ER. Follow up at least yearly for wellness or sooner PRN. Nurse to call patient with any results. The patient is receptive, expresses understanding and is agreeable to plan. All questions have been answered.  No follow-ups on file.

## 2024-04-05 ENCOUNTER — LAB VISIT (OUTPATIENT)
Dept: LAB | Facility: HOSPITAL | Age: 74
End: 2024-04-05
Attending: INTERNAL MEDICINE
Payer: MEDICARE

## 2024-04-05 DIAGNOSIS — E78.2 MIXED HYPERLIPIDEMIA: ICD-10-CM

## 2024-04-05 LAB
CHOLEST SERPL-MCNC: 227 MG/DL
CHOLEST/HDLC SERPL: 4 {RATIO} (ref 0–5)
HDLC SERPL-MCNC: 54 MG/DL (ref 35–60)
LDLC SERPL CALC-MCNC: 139 MG/DL (ref 50–140)
TRIGL SERPL-MCNC: 171 MG/DL (ref 34–140)
VLDLC SERPL CALC-MCNC: 34 MG/DL

## 2024-04-05 PROCEDURE — 36415 COLL VENOUS BLD VENIPUNCTURE: CPT

## 2024-04-05 PROCEDURE — 80061 LIPID PANEL: CPT

## 2024-04-09 ENCOUNTER — TELEPHONE (OUTPATIENT)
Dept: INTERNAL MEDICINE | Facility: CLINIC | Age: 74
End: 2024-04-09
Payer: MEDICARE

## 2024-04-09 NOTE — TELEPHONE ENCOUNTER
----- Message from Omar Donahue MD sent at 4/8/2024  5:48 PM CDT -----  Elevated cholesterol at 227 from 174 triglycerides still elevated at 171, recommend low carbohydrate-low sugar diet   Requested Prescriptions     Pending Prescriptions Disp Refills    empagliflozin (JARDIANCE) 10 MG tablet 28 tablet 0     Sig: Take 1 tablet by mouth daily TAKE ONE TABLET BY MOUTH DAILY

## 2024-06-06 ENCOUNTER — TELEPHONE (OUTPATIENT)
Dept: INTERNAL MEDICINE | Facility: CLINIC | Age: 74
End: 2024-06-06
Payer: MEDICARE

## 2024-06-06 NOTE — TELEPHONE ENCOUNTER
----- Message from Julia Saleem sent at 6/6/2024  1:52 PM CDT -----  Regarding: advice  Type:  Needs Medical Advice    Who Called: pt    Pharmacy name and phone #:  [facility] [address]  Would the patient rather a call back or a response via [facility]? C/b  Best Call Back Number: [phone]    Additional Information: pt was taken off statin and has since felt better and would like pcp to order a different statin  Please c/b pt to discuss

## 2024-06-07 DIAGNOSIS — E78.2 MIXED HYPERLIPIDEMIA: Primary | ICD-10-CM

## 2024-06-07 RX ORDER — ROSUVASTATIN CALCIUM 5 MG/1
5 TABLET, COATED ORAL DAILY
Qty: 90 TABLET | Refills: 1 | Status: SHIPPED | OUTPATIENT
Start: 2024-06-07

## 2024-06-07 RX ORDER — ROSUVASTATIN CALCIUM 5 MG/1
5 TABLET, COATED ORAL DAILY
COMMUNITY
End: 2024-06-07 | Stop reason: SDUPTHER

## 2024-06-07 NOTE — TELEPHONE ENCOUNTER
Spoke to patient, advised of Rx recommendations.  He requested the medication to go to [facility].    Advised him that the Rx will indicate Once a Day for a #90 supply but only to take on Monday Wednesday and Friday    Patient verbalized understanding

## 2024-07-11 ENCOUNTER — TELEPHONE (OUTPATIENT)
Dept: INTERNAL MEDICINE | Facility: CLINIC | Age: 74
End: 2024-07-11
Payer: MEDICARE

## 2024-07-11 DIAGNOSIS — R06.02 SOB (SHORTNESS OF BREATH): Primary | ICD-10-CM

## 2024-07-11 DIAGNOSIS — M54.9 BACK PAIN, UNSPECIFIED BACK LOCATION, UNSPECIFIED BACK PAIN LATERALITY, UNSPECIFIED CHRONICITY: ICD-10-CM

## 2024-07-11 DIAGNOSIS — N52.9 ERECTILE DYSFUNCTION, UNSPECIFIED ERECTILE DYSFUNCTION TYPE: ICD-10-CM

## 2024-07-11 RX ORDER — SILDENAFIL 50 MG/1
50 TABLET, FILM COATED ORAL DAILY PRN
Qty: 30 TABLET | Refills: 0 | Status: SHIPPED | OUTPATIENT
Start: 2024-07-11 | End: 2025-07-11

## 2024-07-11 NOTE — TELEPHONE ENCOUNTER
----- Message from Austin Child sent at 7/11/2024  9:07 AM CDT -----  Who Called: Adam Jean Baptiste    Caller is requesting assistance/information from provider's office.    Symptoms (please be specific):    How long has patient had these symptoms:    List of preferred pharmacies on file (remove unneeded): [unfilled]  If different, enter pharmacy into here including location and phone number:      Preferred Method of Contact: Phone Call  Patient's Preferred Phone Number on File: [phone]   Best Call Back Number, if different:  Additional Information: pt is having swelling /pain in right side below shoulder blade, would like to speak to nurse to determine if he can get an xray ordered of if he needs to sched an appt, please advise

## 2024-07-11 NOTE — TELEPHONE ENCOUNTER
Additional Information: pt is having swelling /pain in right side below shoulder blade, would like to speak to nurse to determine if he can get an xray ordered of if he needs to sched an appt, please advise     **Do you want to order any imaging?

## 2024-07-15 ENCOUNTER — HOSPITAL ENCOUNTER (OUTPATIENT)
Dept: RADIOLOGY | Facility: HOSPITAL | Age: 74
Discharge: HOME | End: 2024-07-15
Attending: INTERNAL MEDICINE
Payer: MEDICARE

## 2024-07-15 DIAGNOSIS — M54.9 BACK PAIN, UNSPECIFIED BACK LOCATION, UNSPECIFIED BACK PAIN LATERALITY, UNSPECIFIED CHRONICITY: ICD-10-CM

## 2024-07-15 DIAGNOSIS — R06.02 SOB (SHORTNESS OF BREATH): ICD-10-CM

## 2024-07-15 PROCEDURE — 71046 X-RAY EXAM CHEST 2 VIEWS: CPT | Mod: TC

## 2024-07-15 PROCEDURE — 72070 X-RAY EXAM THORAC SPINE 2VWS: CPT | Mod: TC

## 2024-07-16 ENCOUNTER — PATIENT MESSAGE (OUTPATIENT)
Dept: INTERNAL MEDICINE | Facility: CLINIC | Age: 74
End: 2024-07-16
Payer: MEDICARE

## 2024-07-16 ENCOUNTER — TELEPHONE (OUTPATIENT)
Dept: INTERNAL MEDICINE | Facility: CLINIC | Age: 74
End: 2024-07-16
Payer: MEDICARE

## 2024-07-16 DIAGNOSIS — M54.9 BACK PAIN, UNSPECIFIED BACK LOCATION, UNSPECIFIED BACK PAIN LATERALITY, UNSPECIFIED CHRONICITY: Primary | ICD-10-CM

## 2024-07-16 DIAGNOSIS — M19.90 ARTHRITIS: ICD-10-CM

## 2024-09-03 ENCOUNTER — TELEPHONE (OUTPATIENT)
Dept: INTERNAL MEDICINE | Facility: CLINIC | Age: 74
End: 2024-09-03
Payer: MEDICARE

## 2024-09-03 DIAGNOSIS — R06.2 WHEEZING: ICD-10-CM

## 2024-09-03 DIAGNOSIS — R06.02 SOB (SHORTNESS OF BREATH): Primary | ICD-10-CM

## 2024-09-03 RX ORDER — IPRATROPIUM BROMIDE AND ALBUTEROL SULFATE 2.5; .5 MG/3ML; MG/3ML
3 SOLUTION RESPIRATORY (INHALATION) EVERY 6 HOURS PRN
COMMUNITY
End: 2024-09-03 | Stop reason: SDUPTHER

## 2024-09-03 RX ORDER — IPRATROPIUM BROMIDE AND ALBUTEROL SULFATE 2.5; .5 MG/3ML; MG/3ML
3 SOLUTION RESPIRATORY (INHALATION) EVERY 6 HOURS PRN
Qty: 75 ML | Refills: 3 | Status: SHIPPED | OUTPATIENT
Start: 2024-09-03

## 2024-09-03 NOTE — TELEPHONE ENCOUNTER
----- Message from [facility] sent at 9/3/2024 11:33 AM CDT -----  .Type:  RX Refill Request    Who Called:  pt    Refill or New Rx: refill    RX Name and Strength:    albuterol-ipratropium 2.5 mg-0.5 mg/3 mL nebulizer solution 3 mL     How is the patient currently taking it? (ex. 1XDay): as needed    Is this a 30 day or 90 day RX: 90    Preferred Pharmacy with phone number:  [id number]  [place], [place] - [address]   Phone: [phone]  Fax: [phone]        Local or Mail Order: local    Ordering Provider:  Verenice    Would the patient rather a call back or a response via [facility]?  Cornelius    Best Call Back Number: [phone]    Additional Information:  refill pt needs this to go inside his nebulizer

## 2024-09-05 ENCOUNTER — HOSPITAL ENCOUNTER (INPATIENT)
Facility: HOSPITAL | Age: 74
LOS: 1 days | Discharge: HOME | DRG: 310 | End: 2024-09-07
Attending: EMERGENCY MEDICINE | Admitting: INTERNAL MEDICINE
Payer: MEDICARE

## 2024-09-05 DIAGNOSIS — I48.91 A-FIB: ICD-10-CM

## 2024-09-05 DIAGNOSIS — I48.91 ATRIAL FIBRILLATION WITH RAPID VENTRICULAR RESPONSE: Primary | ICD-10-CM

## 2024-09-05 DIAGNOSIS — J40 WHEEZY BRONCHITIS: ICD-10-CM

## 2024-09-05 LAB
ALBUMIN SERPL-MCNC: 4.1 G/DL (ref 3.4–4.8)
ALBUMIN/GLOB SERPL: 1.1 RATIO (ref 1.1–2)
ALP SERPL-CCNC: 49 UNIT/L (ref 40–150)
ALT SERPL-CCNC: 34 UNIT/L (ref 0–55)
ANION GAP SERPL CALC-SCNC: 11 MEQ/L
AST SERPL-CCNC: 37 UNIT/L (ref 5–34)
BASOPHILS # BLD AUTO: 0.07 X10(3)/MCL
BASOPHILS NFR BLD AUTO: 0.5 %
BILIRUB SERPL-MCNC: 1.2 MG/DL
BNP BLD-MCNC: 127 PG/ML
BUN SERPL-MCNC: 9.3 MG/DL (ref 8.4–25.7)
CALCIUM SERPL-MCNC: 9.8 MG/DL (ref 8.8–10)
CHLORIDE SERPL-SCNC: 102 MMOL/L (ref 98–107)
CO2 SERPL-SCNC: 26 MMOL/L (ref 23–31)
CREAT SERPL-MCNC: 0.75 MG/DL (ref 0.73–1.18)
CREAT/UREA NIT SERPL: 12
EOSINOPHIL # BLD AUTO: 0.09 X10(3)/MCL (ref 0–0.9)
EOSINOPHIL NFR BLD AUTO: 0.6 %
ERYTHROCYTE [DISTWIDTH] IN BLOOD BY AUTOMATED COUNT: 13 % (ref 11.5–17)
FLUAV AG UPPER RESP QL IA.RAPID: NOT DETECTED
FLUBV AG UPPER RESP QL IA.RAPID: NOT DETECTED
GFR SERPLBLD CREATININE-BSD FMLA CKD-EPI: >60 ML/MIN/1.73/M2
GLOBULIN SER-MCNC: 3.8 GM/DL (ref 2.4–3.5)
GLUCOSE SERPL-MCNC: 126 MG/DL (ref 82–115)
HCT VFR BLD AUTO: 47 % (ref 42–52)
HGB BLD-MCNC: 16 G/DL (ref 14–18)
IMM GRANULOCYTES # BLD AUTO: 0.08 X10(3)/MCL (ref 0–0.04)
IMM GRANULOCYTES NFR BLD AUTO: 0.5 %
LYMPHOCYTES # BLD AUTO: 1.13 X10(3)/MCL (ref 0.6–4.6)
LYMPHOCYTES NFR BLD AUTO: 7.5 %
MCH RBC QN AUTO: 33.5 PG (ref 27–31)
MCHC RBC AUTO-ENTMCNC: 34 G/DL (ref 33–36)
MCV RBC AUTO: 98.3 FL (ref 80–94)
MONOCYTES # BLD AUTO: 1.16 X10(3)/MCL (ref 0.1–1.3)
MONOCYTES NFR BLD AUTO: 7.7 %
NEUTROPHILS # BLD AUTO: 12.63 X10(3)/MCL (ref 2.1–9.2)
NEUTROPHILS NFR BLD AUTO: 83.2 %
NRBC BLD AUTO-RTO: 0 %
PLATELET # BLD AUTO: 141 X10(3)/MCL (ref 130–400)
PMV BLD AUTO: 9.9 FL (ref 7.4–10.4)
POTASSIUM SERPL-SCNC: 4.1 MMOL/L (ref 3.5–5.1)
PROT SERPL-MCNC: 7.9 GM/DL (ref 5.8–7.6)
RBC # BLD AUTO: 4.78 X10(6)/MCL (ref 4.7–6.1)
RSV A 5' UTR RNA NPH QL NAA+PROBE: NOT DETECTED
SARS-COV-2 RNA RESP QL NAA+PROBE: NOT DETECTED
SODIUM SERPL-SCNC: 139 MMOL/L (ref 136–145)
TROPONIN I SERPL-MCNC: 0.01 NG/ML (ref 0–0.04)
WBC # BLD AUTO: 15.16 X10(3)/MCL (ref 4.5–11.5)

## 2024-09-05 PROCEDURE — 63600175 PHARM REV CODE 636 W HCPCS: Performed by: EMERGENCY MEDICINE

## 2024-09-05 PROCEDURE — G0378 HOSPITAL OBSERVATION PER HR: HCPCS

## 2024-09-05 PROCEDURE — 0241U COVID/RSV/FLU A&B PCR: CPT | Performed by: EMERGENCY MEDICINE

## 2024-09-05 PROCEDURE — 80053 COMPREHEN METABOLIC PANEL: CPT | Performed by: EMERGENCY MEDICINE

## 2024-09-05 PROCEDURE — 84484 ASSAY OF TROPONIN QUANT: CPT | Performed by: EMERGENCY MEDICINE

## 2024-09-05 PROCEDURE — 99291 CRITICAL CARE FIRST HOUR: CPT

## 2024-09-05 PROCEDURE — 99900031 HC PATIENT EDUCATION (STAT)

## 2024-09-05 PROCEDURE — 96376 TX/PRO/DX INJ SAME DRUG ADON: CPT

## 2024-09-05 PROCEDURE — 94761 N-INVAS EAR/PLS OXIMETRY MLT: CPT

## 2024-09-05 PROCEDURE — 83880 ASSAY OF NATRIURETIC PEPTIDE: CPT | Performed by: EMERGENCY MEDICINE

## 2024-09-05 PROCEDURE — 25000242 PHARM REV CODE 250 ALT 637 W/ HCPCS: Performed by: EMERGENCY MEDICINE

## 2024-09-05 PROCEDURE — 85025 COMPLETE CBC W/AUTO DIFF WBC: CPT | Performed by: EMERGENCY MEDICINE

## 2024-09-05 PROCEDURE — 25500020 PHARM REV CODE 255: Performed by: EMERGENCY MEDICINE

## 2024-09-05 PROCEDURE — 96365 THER/PROPH/DIAG IV INF INIT: CPT

## 2024-09-05 PROCEDURE — 96375 TX/PRO/DX INJ NEW DRUG ADDON: CPT

## 2024-09-05 PROCEDURE — 94640 AIRWAY INHALATION TREATMENT: CPT

## 2024-09-05 PROCEDURE — 25000003 PHARM REV CODE 250: Performed by: EMERGENCY MEDICINE

## 2024-09-05 PROCEDURE — 93010 ELECTROCARDIOGRAM REPORT: CPT | Mod: ,,, | Performed by: INTERNAL MEDICINE

## 2024-09-05 PROCEDURE — 93005 ELECTROCARDIOGRAM TRACING: CPT

## 2024-09-05 RX ORDER — IPRATROPIUM BROMIDE AND ALBUTEROL SULFATE 2.5; .5 MG/3ML; MG/3ML
3 SOLUTION RESPIRATORY (INHALATION)
Status: COMPLETED | OUTPATIENT
Start: 2024-09-05 | End: 2024-09-05

## 2024-09-05 RX ORDER — DEXAMETHASONE SODIUM PHOSPHATE 4 MG/ML
8 INJECTION, SOLUTION INTRA-ARTICULAR; INTRALESIONAL; INTRAMUSCULAR; INTRAVENOUS; SOFT TISSUE
Status: COMPLETED | OUTPATIENT
Start: 2024-09-05 | End: 2024-09-05

## 2024-09-05 RX ORDER — DILTIAZEM HYDROCHLORIDE 5 MG/ML
15 INJECTION INTRAVENOUS
Status: COMPLETED | OUTPATIENT
Start: 2024-09-05 | End: 2024-09-05

## 2024-09-05 RX ORDER — ONDANSETRON HYDROCHLORIDE 2 MG/ML
4 INJECTION, SOLUTION INTRAVENOUS
Status: COMPLETED | OUTPATIENT
Start: 2024-09-05 | End: 2024-09-05

## 2024-09-05 RX ADMIN — DEXAMETHASONE SODIUM PHOSPHATE 8 MG: 4 INJECTION, SOLUTION INTRA-ARTICULAR; INTRALESIONAL; INTRAMUSCULAR; INTRAVENOUS; SOFT TISSUE at 09:09

## 2024-09-05 RX ADMIN — IOHEXOL 77 ML: 350 INJECTION, SOLUTION INTRAVENOUS at 09:09

## 2024-09-05 RX ADMIN — DILTIAZEM HYDROCHLORIDE 2.5 MG/HR: 5 INJECTION, SOLUTION INTRAVENOUS at 10:09

## 2024-09-05 RX ADMIN — NITROGLYCERIN 1 INCH: 20 OINTMENT TOPICAL at 08:09

## 2024-09-05 RX ADMIN — ONDANSETRON 4 MG: 2 INJECTION INTRAMUSCULAR; INTRAVENOUS at 08:09

## 2024-09-05 RX ADMIN — IPRATROPIUM BROMIDE AND ALBUTEROL SULFATE 3 ML: 2.5; .5 SOLUTION RESPIRATORY (INHALATION) at 08:09

## 2024-09-05 RX ADMIN — DILTIAZEM HYDROCHLORIDE 15 MG: 5 INJECTION INTRAVENOUS at 08:09

## 2024-09-05 RX ADMIN — DILTIAZEM HYDROCHLORIDE 15 MG: 5 INJECTION INTRAVENOUS at 09:09

## 2024-09-05 RX ADMIN — IPRATROPIUM BROMIDE AND ALBUTEROL SULFATE 3 ML: 2.5; .5 SOLUTION RESPIRATORY (INHALATION) at 09:09

## 2024-09-05 NOTE — Clinical Note
Diagnosis: Atrial fibrillation with rapid ventricular response [[id number]]  Future Attending Provider: BRANDON SHANNON [[id number]]  Admit to which facility:: [facility] [[id number]]  Special Needs:: No Special Needs [1]

## 2024-09-06 PROBLEM — I48.91 ATRIAL FIBRILLATION WITH RAPID VENTRICULAR RESPONSE: Status: ACTIVE | Noted: 2024-09-06

## 2024-09-06 LAB
CK MB SERPL-MCNC: 1.8 NG/ML
CK SERPL-CCNC: 115 U/L (ref 30–200)
EST. AVERAGE GLUCOSE BLD GHB EST-MCNC: 111.2 MG/DL
HBA1C MFR BLD: 5.5 %
OHS QRS DURATION: 84 MS
OHS QTC CALCULATION: 394 MS
TROPONIN I SERPL-MCNC: 0.01 NG/ML (ref 0–0.04)
TROPONIN I SERPL-MCNC: 0.01 NG/ML (ref 0–0.04)

## 2024-09-06 PROCEDURE — 11000001 HC ACUTE MED/SURG PRIVATE ROOM

## 2024-09-06 PROCEDURE — 25000003 PHARM REV CODE 250: Performed by: INTERNAL MEDICINE

## 2024-09-06 PROCEDURE — 99900031 HC PATIENT EDUCATION (STAT)

## 2024-09-06 PROCEDURE — 25000242 PHARM REV CODE 250 ALT 637 W/ HCPCS: Performed by: INTERNAL MEDICINE

## 2024-09-06 PROCEDURE — 82553 CREATINE MB FRACTION: CPT | Performed by: EMERGENCY MEDICINE

## 2024-09-06 PROCEDURE — 25000242 PHARM REV CODE 250 ALT 637 W/ HCPCS: Performed by: EMERGENCY MEDICINE

## 2024-09-06 PROCEDURE — 84484 ASSAY OF TROPONIN QUANT: CPT | Performed by: INTERNAL MEDICINE

## 2024-09-06 PROCEDURE — 83036 HEMOGLOBIN GLYCOSYLATED A1C: CPT | Performed by: INTERNAL MEDICINE

## 2024-09-06 PROCEDURE — 93005 ELECTROCARDIOGRAM TRACING: CPT

## 2024-09-06 PROCEDURE — 94640 AIRWAY INHALATION TREATMENT: CPT

## 2024-09-06 PROCEDURE — 94761 N-INVAS EAR/PLS OXIMETRY MLT: CPT

## 2024-09-06 PROCEDURE — 36415 COLL VENOUS BLD VENIPUNCTURE: CPT | Performed by: INTERNAL MEDICINE

## 2024-09-06 PROCEDURE — 96366 THER/PROPH/DIAG IV INF ADDON: CPT

## 2024-09-06 PROCEDURE — 94640 AIRWAY INHALATION TREATMENT: CPT | Mod: XB

## 2024-09-06 PROCEDURE — 94760 N-INVAS EAR/PLS OXIMETRY 1: CPT

## 2024-09-06 PROCEDURE — 93010 ELECTROCARDIOGRAM REPORT: CPT | Mod: ,,, | Performed by: INTERNAL MEDICINE

## 2024-09-06 PROCEDURE — 25000003 PHARM REV CODE 250: Performed by: EMERGENCY MEDICINE

## 2024-09-06 PROCEDURE — 82550 ASSAY OF CK (CPK): CPT | Performed by: EMERGENCY MEDICINE

## 2024-09-06 RX ORDER — IPRATROPIUM BROMIDE AND ALBUTEROL SULFATE 2.5; .5 MG/3ML; MG/3ML
3 SOLUTION RESPIRATORY (INHALATION) EVERY 6 HOURS
Status: DISCONTINUED | OUTPATIENT
Start: 2024-09-06 | End: 2024-09-07 | Stop reason: HOSPADM

## 2024-09-06 RX ORDER — ONDANSETRON HYDROCHLORIDE 2 MG/ML
4 INJECTION, SOLUTION INTRAVENOUS EVERY 8 HOURS PRN
Status: DISCONTINUED | OUTPATIENT
Start: 2024-09-06 | End: 2024-09-07 | Stop reason: HOSPADM

## 2024-09-06 RX ORDER — TALC
6 POWDER (GRAM) TOPICAL NIGHTLY PRN
Status: DISCONTINUED | OUTPATIENT
Start: 2024-09-06 | End: 2024-09-07 | Stop reason: HOSPADM

## 2024-09-06 RX ORDER — CETIRIZINE HYDROCHLORIDE 10 MG/1
10 TABLET ORAL DAILY
Status: DISCONTINUED | OUTPATIENT
Start: 2024-09-06 | End: 2024-09-07 | Stop reason: HOSPADM

## 2024-09-06 RX ORDER — DIGOXIN 125 MCG
0.12 TABLET ORAL DAILY
COMMUNITY
Start: 2024-08-03

## 2024-09-06 RX ORDER — DILTIAZEM HYDROCHLORIDE 30 MG/1
60 TABLET, FILM COATED ORAL EVERY 6 HOURS
Status: DISCONTINUED | OUTPATIENT
Start: 2024-09-06 | End: 2024-09-06

## 2024-09-06 RX ORDER — CLONIDINE HYDROCHLORIDE 0.1 MG/1
0.1 TABLET ORAL EVERY 6 HOURS PRN
Status: DISCONTINUED | OUTPATIENT
Start: 2024-09-06 | End: 2024-09-07 | Stop reason: HOSPADM

## 2024-09-06 RX ORDER — ALLOPURINOL 300 MG/1
300 TABLET ORAL DAILY
Status: DISCONTINUED | OUTPATIENT
Start: 2024-09-06 | End: 2024-09-07 | Stop reason: HOSPADM

## 2024-09-06 RX ORDER — DILTIAZEM HYDROCHLORIDE 60 MG/1
60 TABLET, FILM COATED ORAL EVERY 8 HOURS
Status: DISCONTINUED | OUTPATIENT
Start: 2024-09-06 | End: 2024-09-07 | Stop reason: HOSPADM

## 2024-09-06 RX ORDER — CLONIDINE HYDROCHLORIDE 0.1 MG/1
0.1 TABLET ORAL
Status: ACTIVE | OUTPATIENT
Start: 2024-09-06 | End: 2024-09-06

## 2024-09-06 RX ORDER — SODIUM CHLORIDE 0.9 % (FLUSH) 0.9 %
10 SYRINGE (ML) INJECTION
Status: DISCONTINUED | OUTPATIENT
Start: 2024-09-06 | End: 2024-09-07 | Stop reason: HOSPADM

## 2024-09-06 RX ORDER — FLUTICASONE PROPIONATE 50 MCG
2 SPRAY, SUSPENSION (ML) NASAL 2 TIMES DAILY
Status: DISCONTINUED | OUTPATIENT
Start: 2024-09-06 | End: 2024-09-07 | Stop reason: HOSPADM

## 2024-09-06 RX ADMIN — FLUTICASONE PROPIONATE 100 MCG: 50 SPRAY, METERED NASAL at 12:09

## 2024-09-06 RX ADMIN — IPRATROPIUM BROMIDE AND ALBUTEROL SULFATE 3 ML: 2.5; .5 SOLUTION RESPIRATORY (INHALATION) at 10:09

## 2024-09-06 RX ADMIN — RIVAROXABAN 20 MG: 10 TABLET, FILM COATED ORAL at 04:09

## 2024-09-06 RX ADMIN — DILTIAZEM HYDROCHLORIDE 10 MG/HR: 5 INJECTION, SOLUTION INTRAVENOUS at 09:09

## 2024-09-06 RX ADMIN — DILTIAZEM HYDROCHLORIDE 60 MG: 60 TABLET, FILM COATED ORAL at 12:09

## 2024-09-06 RX ADMIN — ALLOPURINOL 300 MG: 100 TABLET ORAL at 12:09

## 2024-09-06 RX ADMIN — IPRATROPIUM BROMIDE AND ALBUTEROL SULFATE 3 ML: 2.5; .5 SOLUTION RESPIRATORY (INHALATION) at 06:09

## 2024-09-06 RX ADMIN — DILTIAZEM HYDROCHLORIDE 60 MG: 60 TABLET, FILM COATED ORAL at 08:09

## 2024-09-06 RX ADMIN — CETIRIZINE HYDROCHLORIDE 10 MG: 10 TABLET, FILM COATED ORAL at 12:09

## 2024-09-06 RX ADMIN — IPRATROPIUM BROMIDE AND ALBUTEROL SULFATE 3 ML: 2.5; .5 SOLUTION RESPIRATORY (INHALATION) at 02:09

## 2024-09-06 NOTE — NURSING
Nurses Note -- 4 Eyes      9/6/2024   4:39 PM      Skin assessed during: Admit      [x] No Altered Skin Integrity Present    []Prevention Measures Documented      [] Yes- Altered Skin Integrity Present or Discovered   [] LDA Added if Not in Epic (Describe Wound)   [] New Altered Skin Integrity was Present on Admit and Documented in LDA   [] Wound Image Taken    Wound Care Consulted? No    Attending Nurse:  B[place]ire RN/Staff Member:   Leslie

## 2024-09-06 NOTE — ED PROVIDER NOTES
Encounter Date: 9/5/2024       History     Chief Complaint   Patient presents with    Cough    Nasal Congestion     74-year-old male with a history of AFib on Xarelto, gout, HLD, HTN complains of a 12 day history of cough, nasal congestion, and wheezing.  His grand kids have been sick with a viral syndrome.  He states he felt like he was getting better but then over the last few days he started getting worse.  Last albuterol treatment 3:45 p.m. today.  He is taking Coricidin for cold symptoms.        Review of patient's allergies indicates:  No Known Allergies  Past Medical History:   Diagnosis Date    A-fib     Gout, unspecified     HLD (hyperlipidemia)     HTN (hypertension)     Paroxysmal atrial fibrillation      Past Surgical History:   Procedure Laterality Date    ADENOIDECTOMY      APPENDECTOMY      COLONOSCOPY  12/06/2006    DILATION, AQUEOUS OUTFLOW CANAL, TRANSLUMINAL, WITHOUT DEVICE RETENTION Right 3/19/2024    Procedure: DILATION, AQUEOUS OUTFLOW CANAL, TRANSLUMINAL, WITHOUT DEVICE RETENTION;  Surgeon: Jil Chatterjee MD;  Location: [facility] OR;  Service: Ophthalmology;  Laterality: Right;    LAPAROSCOPIC CHOLECYSTECTOMY      PHACOEMULSIFICATION WITH INSERTION, I-STENT Right 3/19/2024    Procedure: PHACO WITH IOL, OMNI, HYDRUS - OD;  Surgeon: Jil Chatterjee MD;  Location: [facility] OR;  Service: Ophthalmology;  Laterality: Right;    TONSILLECTOMY       No family history on file.  Social History     Tobacco Use    Smoking status: Never    Smokeless tobacco: Never   Substance Use Topics    Alcohol use: Yes     Review of Systems   HENT:  Positive for congestion and sore throat.    Respiratory:  Positive for cough.    All other systems reviewed and are negative.      Physical Exam     Initial Vitals [09/05/24 1954]   BP Pulse Resp Temp SpO2   (!) 168/129 (!) 112 (!) 22 97.9 °F (36.6 °C) 98 %      MAP       --         Physical Exam    Nursing note and vitals reviewed.  Constitutional: Vital signs are normal. He  appears well-developed and well-nourished.   HENT:   Head: Normocephalic and atraumatic.   Mouth/Throat: Oropharynx is clear and moist.   Eyes: Pupils are equal, round, and reactive to light.   Neck: Neck supple. No JVD present.   Cardiovascular:  Normal heart sounds.           Irregularly irregular   Pulmonary/Chest: No respiratory distress. He has wheezes.   Abdominal: Abdomen is soft. There is no abdominal tenderness.   Musculoskeletal:         General: No edema.      Cervical back: Neck supple. No edema or erythema.     Lymphadenopathy:     He has no cervical adenopathy.   Neurological: He is alert and oriented to person, place, and time. No cranial nerve deficit. GCS score is 15. GCS eye subscore is 4. GCS verbal subscore is 5. GCS motor subscore is 6.   Skin: Skin is warm and dry. Capillary refill takes less than 2 seconds.         ED Course   Critical Care    Date/Time: 9/6/2024 6:22 AM    Performed by: Verenice Dailey MD  Authorized by: Omra Donahue MD  Direct patient critical care time: 30 minutes  Additional history critical care time: 5 minutes  Ordering / reviewing critical care time: 5 minutes  Documentation critical care time: 6 minutes  Consulting other physicians critical care time: 3 minutes  Total critical care time (exclusive of procedural time) : 49 minutes  Critical care time was exclusive of separately billable procedures and treating other patients.  Critical care was necessary to treat or prevent imminent or life-threatening deterioration of the following conditions: cardiac failure.  Critical care was time spent personally by me on the following activities: blood draw for specimens, discussions with consultants, development of treatment plan with patient or surrogate, interpretation of cardiac output measurements, evaluation of patient's response to treatment, examination of patient, obtaining history from patient or surrogate, ordering and performing treatments and interventions,  ordering and review of laboratory studies, ordering and review of radiographic studies, pulse oximetry, re-evaluation of patient's condition and review of old charts.        Labs Reviewed   COMPREHENSIVE METABOLIC PANEL - Abnormal       Result Value    Sodium 139      Potassium 4.1      Chloride 102      CO2 26      Glucose 126 (*)     Blood Urea Nitrogen 9.3      Creatinine 0.75      Calcium 9.8      Protein Total 7.9 (*)     Albumin 4.1      Globulin 3.8 (*)     Albumin/Globulin Ratio 1.1      Bilirubin Total 1.2      ALP 49      ALT 34      AST 37 (*)     eGFR >60      Anion Gap 11.0      BUN/Creatinine Ratio 12     B-TYPE NATRIURETIC PEPTIDE - Abnormal    Natriuretic Peptide 127.0 (*)    CBC WITH DIFFERENTIAL - Abnormal    WBC 15.16 (*)     RBC 4.78      Hgb 16.0      Hct 47.0      MCV 98.3 (*)     MCH 33.5 (*)     MCHC 34.0      RDW 13.0      Platelet 141      MPV 9.9      Neut % 83.2      Lymph % 7.5      Mono % 7.7      Eos % 0.6      Basophil % 0.5      Lymph # 1.13      Neut # 12.63 (*)     Mono # 1.16      Eos # 0.09      Baso # 0.07      IG# 0.08 (*)     IG% 0.5      NRBC% 0.0     COVID/RSV/FLU A&B PCR - Normal    Influenza A PCR Not Detected      Influenza B PCR Not Detected      Respiratory Syncytial Virus PCR Not Detected      SARS-CoV-2 PCR Not Detected      Narrative:     The Xpert Xpress SARS-CoV-2/FLU/RSV plus is a rapid, multiplexed real-time PCR test intended for the simultaneous qualitative detection and differentiation of SARS-CoV-2, Influenza A, Influenza B, and respiratory syncytial virus (RSV) viral RNA in either nasopharyngeal swab or nasal swab specimens.         TROPONIN I - Normal    Troponin-I 0.011     CK - Normal    Creatine Kinase 115     CK-MB - Normal    Creatine Kinase MB 1.8     CBC W/ AUTO DIFFERENTIAL    Narrative:     The following orders were created for panel order CBC auto differential.  Procedure                               Abnormality         Status                      ---------                               -----------         ------                     CBC with Differential[[id number]]       Abnormal            Final result                 Please view results for these tests on the individual orders.     EKG Readings: (Independently Interpreted)   Initial Reading: No STEMI. Rhythm: Atrial Fibrillation. Heart Rate: 116. ST Segments: Normal ST Segments. T Waves: Normal. Clinical Impression: Atrial Fibrillation with RVR       Imaging Results              CTA Chest Non-Coronary (PE Studies) (Preliminary result)  Result time 09/05/24 22:19:32      Preliminary result by Jonathon Nielsen Jr., MD (09/05/24 22:19:32)                   Narrative:    START OF REPORT:  Technique: CT Scan of the chest was performed with intravenous contrast with direct axial images as well as sagittal and coronal reconstruction images pulmonary embolus protocol.    Dosage Information: Automated Exposure Control was utilized.    Comparison: None.    Clinical History: Pulmonary embolism (PE) suspected, unknown D-dimer.    Findings:  Mediastinum: Multiple scattered subcentimeter mediastinal lymph nodes are seen. There are also calcified left hilar lymph nodes.  Heart: The heart size is within normal limits. Severe coronary artery calcification is seen.  Aorta: Mild aortic calcification is seen in the thoracic aorta.  Pulmonary Arteries: No filling defects are seen in the pulmonary arteries to suggest pulmonary embolus.  Lungs: There is mild non specific dependent change at the lung bases. There is a calcified nodule in the left lower lobe. No acute infiltrate or consolidation is seen.  Pleura: No effusions or pneumothorax are identified.  Bony Structures:  Spine: Mild spondylolytic changes are seen in the thoracic spine.  Abdomen: Surgical clips are seen in the right upper quadrant suggesting prior cholecystectomy. Calcific densities are present in the liver and spleen, likely reflect old calcified  granulomas. A 6 mm nonobstructing stone is seen in the upper pole of the left kidney.      Impression:  1. No filling defects are seen in the pulmonary arteries to suggest pulmonary embolus.  2. A 6 mm nonobstructing stone is seen in the upper pole of the left kidney.  3. No acute intrathoracic process identified. Details and other findings as discussed above.                                         X-Ray Chest AP Portable (Final result)  Result time 09/06/24 06:20:03      Final result by Fabien Morris MD (09/06/24 06:20:03)                   Impression:      No acute findings in the chest      Electronically signed by: Fabien Morris MD  Date:    09/06/2024  Time:    06:20               Narrative:    EXAMINATION:  XR CHEST AP PORTABLE    CLINICAL HISTORY:  Chest Pain;    COMPARISON:  07/15/2024    FINDINGS:  Single view of the chest shows no focal consolidation, pneumothorax or pleural effusion.  Cardiomediastinal silhouette is unchanged.                                       Medications   diltiaZEM (CARDIZEM) 125 mg in D5W 125 mL infusion (10 mg/hr Intravenous Rate/Dose Change 9/6/24 0130)   sodium chloride 0.9% flush 10 mL (has no administration in time range)   melatonin tablet 6 mg (has no administration in time range)   ondansetron injection 4 mg (has no administration in time range)   albuterol-ipratropium 2.5 mg-0.5 mg/3 mL nebulizer solution 3 mL (3 mLs Nebulization Not Given 9/6/24 0100)   cloNIDine tablet 0.1 mg (0.1 mg Oral Not Given 9/6/24 0117)   cloNIDine tablet 0.1 mg (has no administration in time range)   albuterol-ipratropium 2.5 mg-0.5 mg/3 mL nebulizer solution 3 mL (3 mLs Nebulization Given 9/5/24 2037)   diltiaZEM injection 15 mg (15 mg Intravenous Given 9/5/24 2024)   nitroGLYCERIN 2% TD oint ointment 1 inch (1 inch Transdermal Given 9/5/24 2035)   ondansetron injection 4 mg (4 mg Intravenous Given 9/5/24 2048)   diltiaZEM injection 15 mg (15 mg Intravenous Given 9/5/24 2144)    albuterol-ipratropium 2.5 mg-0.5 mg/3 mL nebulizer solution 3 mL (3 mLs Nebulization Given 9/5/24 2143)   dexAMETHasone injection 8 mg (8 mg Intravenous Given 9/5/24 2146)   iohexoL (OMNIPAQUE 350) injection 77 mL (77 mLs Intravenous Given 9/5/24 2147)     Medical Decision Making  See HPI for narrative    Differential diagnosis includes but is not limited to asthma, emphysema, pneumonia, atrial fibrillation, ACS, electrolyte abnormality, dehydration, viral syndrome    Problems Addressed:  Atrial fibrillation with rapid ventricular response:     Details: Patient was seen and evaluated in the emergency department with history, physical exam, lab work, EKG.  He was noted to be in atrial fibrillation with a rapid ventricular response and given Cardizem x2 +a Cardizem drip to control his heart rate.  Clinically his symptoms are consistent with viral syndrome but he did have significant wheezing and was given 2 DuoNebs.  He is being admitted to Internal Medicine for further care of the wheezing and chest congestion and management of the AFib with RVR.    Amount and/or Complexity of Data Reviewed  Labs: ordered. Decision-making details documented in ED Course.  Radiology: ordered.  Discussion of management or test interpretation with external provider(s): Case discussed with Dr. Arriola and we will admit the patient to [facility] for further care.    Risk  Decision regarding hospitalization.               ED Course as of 09/06/24 0623   Thu Sep 05, 2024   2023 Patient now complaining of nausea.  I will give him a dose of Zofran. [SH]   2046 WBC(!): 15.16 [SH]   2046 Hemoglobin: 16.0 [SH]   2046 Hematocrit: 47.0 [SH]   2046 Platelet Count: 141 [SH]   2113 Sodium: 139 [SH]   2114 Potassium: 4.1 [SH]   2114 Chloride: 102 [SH]   2114 CO2: 26 [SH]   2114 Glucose(!): 126 [SH]   2114 BUN: 9.3 [SH]   2114 Creatinine: 0.75 [SH]   2114 WBC(!): 15.16 [SH]   2114 Hemoglobin: 16.0 [SH]   2114 Hematocrit: 47.0  [SH]   2114 Platelet Count: 141 [SH]   2114 BNP(!): 127.0 [SH]   2114 Troponin I: 0.011 [SH]   2119 Patient still has tight wheezes but no increased work of breathing.  Chest x-ray is benign but I am still suspecting there is pneumonia that I am not appreciating on the chest x-ray so I am going to get a noncontrast CT.  I have ordered another DuoNeb, another dose of Cardizem and Decadron due to the tight wheezes [SH]      ED Course User Index  [SH] Verenice Dailey MD                           Clinical Impression:  Final diagnoses:  [I48.91] Atrial fibrillation with rapid ventricular response (Primary)  [J40] Wheezy bronchitis          ED Disposition Condition    Transfer to Another Facility Stable                Verenice Dailey MD  09/06/24 0684

## 2024-09-06 NOTE — ED TRIAGE NOTES
Pt c/o congestion and cough x 12 days. Pt states symptoms have worsened in the past 5 days. Was exposed RSV.

## 2024-09-06 NOTE — CONSULTS
Hospital Medicine Consultation Note        Patient Name: Adam Jean Baptiste  MRN: [id number]  Patient Class: OP- Observation   Admission Date: 9/5/2024  8:01 PM  Length of Stay: 0  Attending Physician: [email or web address]@   Primary Care Provider: Omar Donahue MD  Face-to-Face encounter date: 09/06/2024 g  Consulting Physician: Isidoro Ribera MD  Reason for Consult: afib with rvr  Chief Complaint: Cough and Nasal Congestion        Patient information was obtained from patient, patient's family, past medical records.    HISTORY OF PRESENT ILLNESS:   Adam Jean Baptiste is a 74 y.o. male with  has a past medical history of A-fib, Gout, unspecified, HLD (hyperlipidemia), HTN (hypertension), and Paroxysmal atrial fibrillation..admitted under Dr. Dailey on 9/5/2024 with the diagnosis of afib with rvr, bronchitis. Sore throat, nasal drainage, cought with green yellow sputum congestion x 14 days.  Worsened about 4 days ago and pt called pcp dr Donahue and ordered albuterol inhaler but worsened with increased cough and sputum production, nausea and mild sob.  No f/c/cp/palpitations.  Pt found to be in a fib with rvr, given cardizem bolus and placed on drip.  Pt rate has been controlled 70s in a fib.  Hospital Medicine team was consulted for medical management     PAST MEDICAL HISTORY:     Past Medical History:   Diagnosis Date    A-fib     Gout, unspecified     HLD (hyperlipidemia)     HTN (hypertension)     Paroxysmal atrial fibrillation        PAST SURGICAL HISTORY:     Past Surgical History:   Procedure Laterality Date    ADENOIDECTOMY      COLONOSCOPY  12/06/2006    DILATION, AQUEOUS OUTFLOW CANAL, TRANSLUMINAL, WITHOUT DEVICE RETENTION Right 03/19/2024    Procedure: DILATION, AQUEOUS OUTFLOW CANAL, TRANSLUMINAL, WITHOUT DEVICE RETENTION;  Surgeon: Jil Chatterjee MD;  Location: [facility];  Service: Ophthalmology;  Laterality: Right;    LAPAROSCOPIC CHOLECYSTECTOMY      PHACOEMULSIFICATION WITH INSERTION, I-STENT Right  03/19/2024    Procedure: PHACO WITH IOL, OMNI, HYDRUS - OD;  Surgeon: Jil Chatterjee MD;  Location: [facility];  Service: Ophthalmology;  Laterality: Right;    TONSILLECTOMY         ALLERGIES:   Patient has no known allergies.    FAMILY HISTORY:     Family History   Problem Relation Name Age of Onset    Hypertension Mother      Valvular heart disease Mother      Heart disease Father          SOCIAL HISTORY:     Social History     Tobacco Use    Smoking status: Never    Smokeless tobacco: Never   Substance Use Topics    Alcohol use: Yes     Alcohol/week: 38.0 standard drinks of alcohol     Types: 24 Cans of beer, 14 Shots of liquor per week        HOME MEDICATIONS:     Prior to Admission medications    Medication Sig Start Date End Date Taking? Authorizing Provider   allopurinoL (ZYLOPRIM) 300 MG tablet Take 1 tablet (300 mg total) by mouth once daily. 12/21/23  Yes Omar Donahue MD   co-enzyme Q-10 30 mg capsule Take 30 mg by mouth 3 (three) times daily.   Yes Provider, Historical   digoxin (LANOXIN) 125 mcg tablet Take 0.125 mg by mouth once daily. 8/3/24  Yes Provider, Historical   multivitamin capsule Take 1 capsule by mouth once daily.   Yes Provider, Historical   rivaroxaban (XARELTO) 20 mg Tab Take 20 mg by mouth daily with dinner or evening meal.   Yes Provider, Historical   rosuvastatin (CRESTOR) 5 MG tablet Take 1 tablet (5 mg total) by mouth once daily.  Patient taking differently: Take 5 mg by mouth every other day. 6/7/24  Yes Omar Donahue MD   zinc gluconate 50 mg tablet Take 50 mg by mouth once daily.   Yes Provider, Historical   albuterol-ipratropium (DUO-NEB) 2.5 mg-0.5 mg/3 mL nebulizer solution Take 3 mLs by nebulization every 6 (six) hours as needed for Wheezing. Rescue 9/3/24   Omar Donahue MD   sildenafiL (VIAGRA) 50 MG tablet Take 1 tablet (50 mg total) by mouth daily as needed for Erectile Dysfunction. 7/11/24 7/11/25  Omar Donahue MD       REVIEW OF SYSTEMS:   Review of Systems   All  "other systems reviewed and are negative.       PHYSICAL EXAM:   /81   Pulse 68   Temp 98.4 °F (36.9 °C) (Oral)   Resp 15   Ht 6' 4" (1.93 m)   Wt 120.2 kg (265 lb)   SpO2 95%   BMI 32.26 kg/m²     Physical Exam  Constitutional:       Appearance: He is obese. He is not ill-appearing.   HENT:      Mouth/Throat:      Pharynx: No oropharyngeal exudate.   Eyes:      Extraocular Movements: Extraocular movements intact.      Pupils: Pupils are equal, round, and reactive to light.   Cardiovascular:      Rate and Rhythm: Rhythm irregular.      Heart sounds: Normal heart sounds.   Pulmonary:      Breath sounds: Wheezing present.   Abdominal:      General: Bowel sounds are normal.      Palpations: Abdomen is soft.   Musculoskeletal:      Cervical back: Neck supple.      Comments: Trace rle     Skin:     Findings: No rash.   Neurological:      General: No focal deficit present.      Mental Status: He is alert.   Psychiatric:         Mood and Affect: Mood normal.         Thought Content: Thought content normal.          LABS AND IMAGING:     Admission on 09/05/2024   Component Date Value Ref Range Status    Influenza A PCR 09/05/2024 Not Detected  Not Detected Final    Influenza B PCR 09/05/2024 Not Detected  Not Detected Final    Respiratory Syncytial Virus PCR 09/05/2024 Not Detected  Not Detected Final    SARS-CoV-2 PCR 09/05/2024 Not Detected  Not Detected, Negative Final    Sodium 09/05/2024 139  136 - 145 mmol/L Final    Potassium 09/05/2024 4.1  3.5 - 5.1 mmol/L Final    Chloride 09/05/2024 102  98 - 107 mmol/L Final    CO2 09/05/2024 26  23 - 31 mmol/L Final    Glucose 09/05/2024 126 (H)  82 - 115 mg/dL Final    Blood Urea Nitrogen 09/05/2024 9.3  8.4 - 25.7 mg/dL Final    Creatinine 09/05/2024 0.75  0.73 - 1.18 mg/dL Final    Calcium 09/05/2024 9.8  8.8 - 10.0 mg/dL Final    Protein Total 09/05/2024 7.9 (H)  5.8 - 7.6 gm/dL Final    Albumin 09/05/2024 4.1  3.4 - 4.8 g/dL Final    Globulin 09/05/2024 3.8 (H) "  2.4 - 3.5 gm/dL Final    Albumin/Globulin Ratio 09/05/2024 1.1  1.1 - 2.0 ratio Final    Bilirubin Total 09/05/2024 1.2  <=1.5 mg/dL Final    ALP 09/05/2024 49  40 - 150 unit/L Final    ALT 09/05/2024 34  0 - 55 unit/L Final    AST 09/05/2024 37 (H)  5 - 34 unit/L Final    eGFR 09/05/2024 >60  mL/min/1.73/m2 Final    Anion Gap 09/05/2024 11.0  mEq/L Final    BUN/Creatinine Ratio 09/05/2024 12   Final    Troponin-I 09/05/2024 0.011  0.000 - 0.045 ng/mL Final    Natriuretic Peptide 09/05/2024 127.0 (H)  <=100.0 pg/mL Final    WBC 09/05/2024 15.16 (H)  4.50 - 11.50 x10(3)/mcL Final    RBC 09/05/2024 4.78  4.70 - 6.10 x10(6)/mcL Final    Hgb 09/05/2024 16.0  14.0 - 18.0 g/dL Final    Hct 09/05/2024 47.0  42.0 - 52.0 % Final    MCV 09/05/2024 98.3 (H)  80.0 - 94.0 fL Final    MCH 09/05/2024 33.5 (H)  27.0 - 31.0 pg Final    MCHC 09/05/2024 34.0  33.0 - 36.0 g/dL Final    RDW 09/05/2024 13.0  11.5 - 17.0 % Final    Platelet 09/05/2024 141  130 - 400 x10(3)/mcL Final    MPV 09/05/2024 9.9  7.4 - 10.4 fL Final    Neut % 09/05/2024 83.2  % Final    Lymph % 09/05/2024 7.5  % Final    Mono % 09/05/2024 7.7  % Final    Eos % 09/05/2024 0.6  % Final    Basophil % 09/05/2024 0.5  % Final    Lymph # 09/05/2024 1.13  0.6 - 4.6 x10(3)/mcL Final    Neut # 09/05/2024 12.63 (H)  2.1 - 9.2 x10(3)/mcL Final    Mono # 09/05/2024 1.16  0.1 - 1.3 x10(3)/mcL Final    Eos # 09/05/2024 0.09  0 - 0.9 x10(3)/mcL Final    Baso # 09/05/2024 0.07  <=0.2 x10(3)/mcL Final    IG# 09/05/2024 0.08 (H)  0 - 0.04 x10(3)/mcL Final    IG% 09/05/2024 0.5  % Final    NRBC% 09/05/2024 0.0  % Final    Creatine Kinase 09/06/2024 115  30 - 200 U/L Final    Creatine Kinase MB 09/06/2024 1.8  <=7.2 ng/mL Final        CTA Chest Non-Coronary (PE Studies)    Result Date: 9/6/2024  Technique: CT Scan of the chest was performed with intravenous contrast with direct axial images as well as sagittal and coronal reconstruction images pulmonary embolus protocol. Dosage  Information: Automated Exposure Control was utilized. Comparison: None. Clinical History: Pulmonary embolism (PE) suspected, unknown D-dimer. Findings: Mediastinum: Multiple scattered subcentimeter mediastinal lymph nodes are seen. There are also calcified left hilar lymph nodes. Heart: The heart size is within normal limits.  Coronary artery calcification is seen. Aorta: Mild aortic calcification is seen in the thoracic aorta. Pulmonary Arteries: No filling defects are seen in the pulmonary arteries to suggest pulmonary embolus. Lungs: There is mild non specific dependent change at the lung bases. There is a calcified nodule in the left lower lobe. No acute infiltrate or consolidation is seen. Pleura: No effusions or pneumothorax are identified. Bony Structures: Spine: Mild spondylolytic changes are seen in the thoracic spine. Abdomen: Surgical clips are seen in the right upper quadrant suggesting prior cholecystectomy. Calcific densities are present in the liver and spleen, likely reflect old calcified granulomas. A 6 mm nonobstructing stone is seen in the upper pole of the left kidney.     Impression: 1. No filling defects are seen in the pulmonary arteries to suggest pulmonary embolus. 2. A 6 mm nonobstructing stone is seen in the upper pole of the left kidney. No significant discrepancy with overnight report. 3. No acute intrathoracic process identified. Details and other findings as discussed above. No significant discrepancy with overnight report Electronically signed by: Clayton Hdoges Date:    09/06/2024 Time:    06:30    X-Ray Chest AP Portable    Result Date: 9/6/2024  EXAMINATION: XR CHEST AP PORTABLE CLINICAL HISTORY: Chest Pain; COMPARISON: 07/15/2024 FINDINGS: Single view of the chest shows no focal consolidation, pneumothorax or pleural effusion.  Cardiomediastinal silhouette is unchanged.     No acute findings in the chest Electronically signed by: Fabien Morris MD Date:    09/06/2024 Time:    06:20        Microbiology Results (last 7 days)       ** No results found for the last 168 hours. **             ASSESSMENT & PLAN:   Atrial fibrillation with RVR  Bronchitis  Hx afib,gout,htn,hld    Plan    Cardizem drip wean as tolerated  Start cardizem 60mg po   Home meds  Consult cards dr Marita Yi/doug      DVT: Prophylaxis:  Xarelto        __________________________________________________________________________  INPATIENT LIST OF MEDICATIONS     Current Facility-Administered Medications:     albuterol-ipratropium 2.5 mg-0.5 mg/3 mL nebulizer solution 3 mL, 3 mL, Nebulization, Q6H, Isidoro Ribera MD, 3 mL at 09/06/24 0659    allopurinoL tablet 300 mg, 300 mg, Oral, Daily, Isidoro Ribera MD    cloNIDine tablet 0.1 mg, 0.1 mg, Oral, ED 1 Time, Isidoro Ribera MD    cloNIDine tablet 0.1 mg, 0.1 mg, Oral, Q6H PRN, Isidoro Ribera MD    diltiaZEM (CARDIZEM) 125 mg in D5W 125 mL infusion, 0-15 mg/hr, Intravenous, Continuous, Isidoro Ribera MD, Last Rate: 10 mL/hr at 09/06/24 0955, 10 mg/hr at 09/06/24 0955    melatonin tablet 6 mg, 6 mg, Oral, Nightly PRN, Isidoro Ribera MD    ondansetron injection 4 mg, 4 mg, Intravenous, Q8H PRN, Isidoro Ribera MD    rivaroxaban tablet 20 mg, 20 mg, Oral, Daily with dinner, Isidoro Ribera MD    sodium chloride 0.9% flush 10 mL, 10 mL, Intravenous, PRN, Isidoro Ribera MD    Current Outpatient Medications:     allopurinoL (ZYLOPRIM) 300 MG tablet, Take 1 tablet (300 mg total) by mouth once daily., Disp: 90 tablet, Rfl: 3    co-enzyme Q-10 30 mg capsule, Take 30 mg by mouth 3 (three) times daily., Disp: , Rfl:     digoxin (LANOXIN) 125 mcg tablet, Take 0.125 mg by mouth once daily., Disp: , Rfl:     multivitamin capsule, Take 1 capsule by mouth once daily., Disp: , Rfl:     rivaroxaban (XARELTO) 20 mg Tab, Take 20 mg by mouth daily with dinner or evening meal., Disp: , Rfl:     rosuvastatin (CRESTOR) 5 MG tablet, Take 1 tablet (5 mg total) by mouth  once daily. (Patient taking differently: Take 5 mg by mouth every other day.), Disp: 90 tablet, Rfl: 1    zinc gluconate 50 mg tablet, Take 50 mg by mouth once daily., Disp: , Rfl:     albuterol-ipratropium (DUO-NEB) 2.5 mg-0.5 mg/3 mL nebulizer solution, Take 3 mLs by nebulization every 6 (six) hours as needed for Wheezing. Rescue, Disp: 75 mL, Rfl: 3    sildenafiL (VIAGRA) 50 MG tablet, Take 1 tablet (50 mg total) by mouth daily as needed for Erectile Dysfunction., Disp: 30 tablet, Rfl: 0      Scheduled Meds:   albuterol-ipratropium  3 mL Nebulization Q6H    allopurinoL  300 mg Oral Daily    cloNIDine  0.1 mg Oral ED 1 Time    rivaroxaban  20 mg Oral Daily with dinner     Continuous Infusions:   dilTIAZem  0-15 mg/hr Intravenous Continuous 10 mL/hr at 09/06/24 0955 10 mg/hr at 09/06/24 0955     PRN Meds:.  Current Facility-Administered Medications:     cloNIDine, 0.1 mg, Oral, Q6H PRN    melatonin, 6 mg, Oral, Nightly PRN    ondansetron, 4 mg, Intravenous, Q8H PRN    sodium chloride 0.9%, 10 mL, Intravenous, PRN    ______________________________________________________________________________    Thank you for the consult, will be happy to follow alongside you      All diagnosis and differential diagnosis have been reviewed; assessment and plan has been documented; I have personally reviewed the labs and test results that are presently available; I have reviewed the patients medication list; I have reviewed the consulting providers response and recommendations. I have reviewed or attempted to review medical records based upon their availability.    All of the patient and family questions have been addressed and answered. Patient's is agreeable to the above stated plan. I will continue to monitor closely and make adjustments to medical management as needed.    Isidoro Ribera MD   09/06/2024

## 2024-09-07 VITALS
OXYGEN SATURATION: 95 % | RESPIRATION RATE: 18 BRPM | DIASTOLIC BLOOD PRESSURE: 82 MMHG | WEIGHT: 264.75 LBS | HEART RATE: 69 BPM | BODY MASS INDEX: 32.24 KG/M2 | TEMPERATURE: 98 F | SYSTOLIC BLOOD PRESSURE: 143 MMHG | HEIGHT: 76 IN

## 2024-09-07 PROBLEM — J40 WHEEZY BRONCHITIS: Status: ACTIVE | Noted: 2024-09-07

## 2024-09-07 PROCEDURE — 94760 N-INVAS EAR/PLS OXIMETRY 1: CPT

## 2024-09-07 PROCEDURE — 25000003 PHARM REV CODE 250: Performed by: INTERNAL MEDICINE

## 2024-09-07 PROCEDURE — 99900035 HC TECH TIME PER 15 MIN (STAT)

## 2024-09-07 PROCEDURE — 99900031 HC PATIENT EDUCATION (STAT)

## 2024-09-07 PROCEDURE — 25000242 PHARM REV CODE 250 ALT 637 W/ HCPCS: Performed by: INTERNAL MEDICINE

## 2024-09-07 PROCEDURE — 99239 HOSP IP/OBS DSCHRG MGMT >30: CPT | Mod: ,,, | Performed by: INTERNAL MEDICINE

## 2024-09-07 PROCEDURE — 94640 AIRWAY INHALATION TREATMENT: CPT

## 2024-09-07 PROCEDURE — 1111F DSCHRG MED/CURRENT MED MERGE: CPT | Mod: CPTII,,, | Performed by: INTERNAL MEDICINE

## 2024-09-07 RX ORDER — DILTIAZEM HYDROCHLORIDE 60 MG/1
60 TABLET, FILM COATED ORAL EVERY 8 HOURS
Qty: 90 TABLET | Refills: 11 | Status: SHIPPED | OUTPATIENT
Start: 2024-09-07 | End: 2025-09-07

## 2024-09-07 RX ORDER — IPRATROPIUM BROMIDE AND ALBUTEROL SULFATE 2.5; .5 MG/3ML; MG/3ML
3 SOLUTION RESPIRATORY (INHALATION) EVERY 6 HOURS
Qty: 75 ML | Refills: 0 | Status: SHIPPED | OUTPATIENT
Start: 2024-09-07

## 2024-09-07 RX ADMIN — CETIRIZINE HYDROCHLORIDE 10 MG: 10 TABLET, FILM COATED ORAL at 09:09

## 2024-09-07 RX ADMIN — ALLOPURINOL 300 MG: 100 TABLET ORAL at 09:09

## 2024-09-07 RX ADMIN — DILTIAZEM HYDROCHLORIDE 60 MG: 60 TABLET, FILM COATED ORAL at 02:09

## 2024-09-07 RX ADMIN — IPRATROPIUM BROMIDE AND ALBUTEROL SULFATE 3 ML: 2.5; .5 SOLUTION RESPIRATORY (INHALATION) at 08:09

## 2024-09-07 RX ADMIN — FLUTICASONE PROPIONATE 100 MCG: 50 SPRAY, METERED NASAL at 09:09

## 2024-09-07 RX ADMIN — DILTIAZEM HYDROCHLORIDE 60 MG: 60 TABLET, FILM COATED ORAL at 05:09

## 2024-09-07 NOTE — NURSING
Nurses Note -- 4 Eyes      9/7/2024   2:53 PM      Skin assessed during: Q Shift Change      [x] No Altered Skin Integrity Present    []Prevention Measures Documented      [] Yes- Altered Skin Integrity Present or Discovered   [] LDA Added if Not in Epic (Describe Wound)   [] New Altered Skin Integrity was Present on Admit and Documented in LDA   [] Wound Image Taken    Wound Care Consulted? No    Attending Nurse:  CARMEN Matute     Second RN/Staff Member:   CARMEN Odonnell

## 2024-09-07 NOTE — PLAN OF CARE
09/07/24 1410   Final Note   Assessment Type Final Discharge Note   Anticipated Discharge Disposition Home   Post-Acute Status   Discharge Delays None known at this time     Pt will dc to home . No needs noted for CM

## 2024-09-07 NOTE — DISCHARGE SUMMARY
[facility] - 6th [facility] Medicine  Discharge Summary      Patient Name: Adam Jean Baptiste  MRN: [id number]  Admission Date: 9/5/2024  Hospital Length of Stay: 1 days  Discharge Date and Time:  09/07/2024 1:37 PM  Attending Physician: Omar Donahue MD   Discharging Provider: CATRACHITO CASTILLO MD  Discharge Provider Team: Networked reference to record PCT   Primary Care Provider: Omar Donahue MD        HPI: Adam Jean Baptiste is a 74 y.o. male with  has a past medical history of A-fib, Gout, unspecified, HLD (hyperlipidemia), HTN (hypertension), and Paroxysmal atrial fibrillation..admitted under Dr. Dailey on 9/5/2024 with the diagnosis of afib with rvr, bronchitis. Sore throat, nasal drainage, cought with green yellow sputum congestion x 14 days.  Worsened about 4 days ago and pt called pcp dr Donahue and ordered albuterol inhaler but worsened with increased cough and sputum production, nausea and mild sob.  No f/c/cp/palpitations.  Pt found to be in a fib with rvr, given cardizem bolus and placed on drip.  Pt rate has been controlled 70s in a fib.  Hospital Medicine team was consulted for medical management     * No surgery found *      Hospital Course:  Hospital course was uncomplicated.  He was placed on Cardizem drip and responded nicely.  Remains in AFib is rate controlled.  Does have a history of AFib over the last 10 years in his on oral anticoagulation.  Now he has been transitioned over to p.o. Cardizem 60 mg t.i.d. he remains rate controlled and again anticoagulated.  He was stable denies any chest pain shortness for breath he is asymptomatic vital signs stable afebrile he was ready for discharge.  He does report having a history of sleep apnea for which he can not tolerate is PAP therapy advised re trying sleep apnea management also recommended alcohol cessation or least moderation overall he was stable ready for discharge.    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  Medications:  Reconciled Home Medications:      Medication List        START taking these medications      diltiaZEM 60 MG tablet  Commonly known as: CARDIZEM  Take 1 tablet (60 mg total) by mouth every 8 (eight) hours.            CHANGE how you take these medications      rosuvastatin 5 MG tablet  Commonly known as: CRESTOR  Take 1 tablet (5 mg total) by mouth once daily.  What changed: when to take this            CONTINUE taking these medications      albuterol-ipratropium 2.5 mg-0.5 mg/3 mL nebulizer solution  Commonly known as: DUO-NEB  Take 3 mLs by nebulization every 6 (six) hours as needed for Wheezing. Rescue     allopurinoL 300 MG tablet  Commonly known as: ZYLOPRIM  Take 1 tablet (300 mg total) by mouth once daily.     co-enzyme Q-10 30 mg capsule  Take 30 mg by mouth 3 (three) times daily.     digoxin 125 mcg tablet  Commonly known as: LANOXIN  Take 0.125 mg by mouth once daily.     multivitamin capsule  Take 1 capsule by mouth once daily.     rivaroxaban 20 mg Tab  Commonly known as: XARELTO  Take 20 mg by mouth daily with dinner or evening meal.     sildenafiL 50 MG tablet  Commonly known as: VIAGRA  Take 1 tablet (50 mg total) by mouth daily as needed for Erectile Dysfunction.     zinc gluconate 50 mg tablet  Take 50 mg by mouth once  daily.              Significant Diagnostic Studies: N/A    Pending Diagnostic Studies:       None          Indwelling Lines/Drains at time of discharge:   Lines/Drains/Airways       None                   Time spent on the discharge of patient: 33 minutes         CATRACHITO CASTILLO MD  Department of Hospital Medicine  [facility] - 6th Floor Medical Telemetry

## 2024-09-07 NOTE — CONSULTS
Inpatient consult to Cardiology  Consult performed by: Kareem Trinidad FNP  Consult ordered by: Isidoro Ribera MD  Reason for consult: AF        [facility]    Cardiology  Consult Note    Patient Name: Adam Jean Baptiste  MRN: [id number]  Admission Date: 9/5/2024  Hospital Length of Stay: 1 days  Code Status: Full Code   Attending Provider: Omar Donahue MD   Consulting Provider: BRUNO Amezcua  Primary Care Physician: Omar Donahue MD  Principal Problem:Atrial fibrillation with rapid ventricular response    Patient information was obtained from patient, past medical records, ER records, and primary team.     Subjective:   Chief Complaint/Reason for Consult: AF    HPI:   Mr. Jean Baptiste is a 74 year old male, unknown to [facility] (Dr. Kirkland), who presented to the hospital with bronchitis, sore throat, and nasal congestion. In the ER he was found to be in AF RVR. Does have history of PAF. Was placed on Cardizem Infusion. Reported cough with sputum production, mild SOB. No mention of CP or palpitations. Patient was rate controlled in the ER with IV Cardizem. Admitted to Hospital Medicine Team. [facility] consulted for AF Management.    PMH: PAF, Gout, Hypertension, Hyperlipidemia  PSH: Adenoidectomy, Colonoscopy, Cholecystectomy, Tonsillectomy  Family History: Mother- Hypertension/VHD, Father- Heart Disease  Social History: Tobacco- Negative, Alcohol- Regular Use, Substance abuse- Negative    Previous Cardiac Diagnostics:   CT Angiogram Chest (9.5.24):  No filling defects are seen in the pulmonary arteries to suggest pulmonary embolus.  6 mm nonobstructing stone is seen in the upper pole of the left kidney.  No significant discrepancy with overnight report.  No acute intrathoracic process identified. Details and other findings as discussed above.    Review of patient's allergies indicates:  No Known Allergies  No current facility-administered medications on file prior to encounter.      Current Outpatient Medications on File Prior to Encounter   Medication Sig    allopurinoL (ZYLOPRIM) 300 MG tablet Take 1 tablet (300 mg total) by mouth once daily.    co-enzyme Q-10 30 mg capsule Take 30 mg by mouth 3 (three) times daily.    digoxin (LANOXIN) 125 mcg tablet Take 0.125 mg by mouth once daily.    multivitamin capsule Take 1 capsule by mouth once daily.    rivaroxaban (XARELTO) 20 mg Tab Take 20 mg by mouth daily with dinner or evening meal.    rosuvastatin (CRESTOR) 5 MG tablet Take 1 tablet (5 mg total) by mouth once daily. (Patient taking differently: Take 5 mg by mouth every other day.)    zinc gluconate 50 mg tablet Take 50 mg by mouth once daily.    albuterol-ipratropium (DUO-NEB) 2.5 mg-0.5 mg/3 mL nebulizer solution Take 3 mLs by nebulization every 6 (six) hours as needed for Wheezing. Rescue    sildenafiL (VIAGRA) 50 MG tablet Take 1 tablet (50 mg total) by mouth daily as needed for Erectile Dysfunction.     Review of Systems   Respiratory:  Positive for cough. Negative for chest tightness and shortness of breath.    Cardiovascular:  Negative for chest pain.   All other systems reviewed and are negative.    Objective:     Vital Signs (Most Recent):  Temp: 97.8 °F (36.6 °C) (09/07/24 0315)  Pulse: (!) 56 (09/07/24 0400)  Resp: 19 (09/06/24 2221)  BP: 114/74 (09/07/24 0315)  SpO2: 95 % (09/07/24 0315) Vital Signs (24h Range):  Temp:  [97.8 °F (36.6 °C)-98.5 °F (36.9 °C)] 97.8 °F (36.6 °C)  Pulse:  [56-89] 56  Resp:  [14-20] 19  SpO2:  [94 %-100 %] 95 %  BP: (114-151)/(71-86) 114/74   Weight: 120.1 kg (264 lb 12.4 oz)  Body mass index is 32.23 kg/m².  SpO2: 95 %       Intake/Output Summary (Last 24 hours) at 9/7/2024 0684  Last data filed at 9/7/2024 0555  Gross per 24 hour   Intake 120 ml   Output --   Net 120 ml     Lines/Drains/Airways       Peripheral Intravenous Line  Duration                  Peripheral IV - Single Lumen 09/07/24 0621 20 G Anterior;Right Wrist <1 day               "    Significant Labs:   Chemistries:   Recent Labs   Lab 09/05/24 2031 09/05/24 2032 09/06/24  1129 09/06/24  1726   NA  --  139  --   --    K  --  4.1  --   --    CL  --  102  --   --    CO2  --  26  --   --    BUN  --  9.3  --   --    CREATININE  --  0.75  --   --    CALCIUM  --  9.8  --   --    BILITOT  --  1.2  --   --    ALKPHOS  --  49  --   --    ALT  --  34  --   --    AST  --  37*  --   --    GLUCOSE  --  126*  --   --    TROPONINI 0.011  --  0.010 0.015        CBC/Anemia Labs: Coags:    Recent Labs   Lab 09/05/24 2032   WBC 15.16*   HGB 16.0   HCT 47.0      MCV 98.3*   RDW 13.0    No results for input(s): "PT", "INR", "APTT" in the last 168 hours.     Significant Imaging:  Imaging Results              CTA Chest Non-Coronary (PE Studies) (Final result)  Result time 09/06/24 06:30:17      Final result by Clayton Hodges MD (09/06/24 06:30:17)                   Impression:    Impression:    1. No filling defects are seen in the pulmonary arteries to suggest pulmonary embolus.    2. A 6 mm nonobstructing stone is seen in the upper pole of the left kidney.    No significant discrepancy with overnight report.    3. No acute intrathoracic process identified. Details and other findings as discussed above.    No significant discrepancy with overnight report      Electronically signed by: Clayton Hodges  Date:    09/06/2024  Time:    06:30               Narrative:      Technique: CT Scan of the chest was performed with intravenous contrast with direct axial images as well as sagittal and coronal reconstruction images pulmonary embolus protocol.    Dosage Information: Automated Exposure Control was utilized.    Comparison: None.    Clinical History: Pulmonary embolism (PE) suspected, unknown D-dimer.    Findings:    Mediastinum: Multiple scattered subcentimeter mediastinal lymph nodes are seen. There are also calcified left hilar lymph nodes.    Heart: The heart size is within normal limits.  Coronary artery " calcification is seen.    Aorta: Mild aortic calcification is seen in the thoracic aorta.    Pulmonary Arteries: No filling defects are seen in the pulmonary arteries to suggest pulmonary embolus.    Lungs: There is mild non specific dependent change at the lung bases. There is a calcified nodule in the left lower lobe. No acute infiltrate or consolidation is seen.    Pleura: No effusions or pneumothorax are identified.    Bony Structures:    Spine: Mild spondylolytic changes are seen in the thoracic spine.    Abdomen: Surgical clips are seen in the right upper quadrant suggesting prior cholecystectomy. Calcific densities are present in the liver and spleen, likely reflect old calcified granulomas. A 6 mm nonobstructing stone is seen in the upper pole of the left kidney.                        Preliminary result by Jonathon Nielsen Jr., MD (09/05/24 22:19:32)                   Impression:    1. No filling defects are seen in the pulmonary arteries to suggest pulmonary embolus.  2. A 6 mm nonobstructing stone is seen in the upper pole of the left kidney.  3. No acute intrathoracic process identified. Details and other findings as discussed above.               Narrative:    START OF REPORT:  Technique: CT Scan of the chest was performed with intravenous contrast with direct axial images as well as sagittal and coronal reconstruction images pulmonary embolus protocol.    Dosage Information: Automated Exposure Control was utilized.    Comparison: None.    Clinical History: Pulmonary embolism (PE) suspected, unknown D-dimer.    Findings:  Mediastinum: Multiple scattered subcentimeter mediastinal lymph nodes are seen. There are also calcified left hilar lymph nodes.  Heart: The heart size is within normal limits. Severe coronary artery calcification is seen.  Aorta: Mild aortic calcification is seen in the thoracic aorta.  Pulmonary Arteries: No filling defects are seen in the pulmonary arteries to suggest pulmonary  embolus.  Lungs: There is mild non specific dependent change at the lung bases. There is a calcified nodule in the left lower lobe. No acute infiltrate or consolidation is seen.  Pleura: No effusions or pneumothorax are identified.  Bony Structures:  Spine: Mild spondylolytic changes are seen in the thoracic spine.  Abdomen: Surgical clips are seen in the right upper quadrant suggesting prior cholecystectomy. Calcific densities are present in the liver and spleen, likely reflect old calcified granulomas. A 6 mm nonobstructing stone is seen in the upper pole of the left kidney.                                         X-Ray Chest AP Portable (Final result)  Result time 09/06/24 06:20:03      Final result by Fabien Morris MD (09/06/24 06:20:03)                   Impression:      No acute findings in the chest      Electronically signed by: Fabien Morris MD  Date:    09/06/2024  Time:    06:20               Narrative:    EXAMINATION:  XR CHEST AP PORTABLE    CLINICAL HISTORY:  Chest Pain;    COMPARISON:  07/15/2024    FINDINGS:  Single view of the chest shows no focal consolidation, pneumothorax or pleural effusion.  Cardiomediastinal silhouette is unchanged.                                    EKG:       Telemetry:  AF    Physical Exam  Vitals and nursing note reviewed.   Constitutional:       General: He is not in acute distress.     Appearance: He is obese.   HENT:      Head: Normocephalic.      Mouth/Throat:      Mouth: Mucous membranes are moist.      Pharynx: Oropharynx is clear.   Cardiovascular:      Rate and Rhythm: Normal rate. Rhythm irregular.   Pulmonary:      Effort: Pulmonary effort is normal. No respiratory distress.      Comments: On RA  Abdominal:      Palpations: Abdomen is soft.   Musculoskeletal:         General: Normal range of motion.      Cervical back: Neck supple.   Skin:     General: Skin is warm and dry.   Neurological:      General: No focal deficit present.      Mental Status: He is alert  and oriented to person, place, and time. Mental status is at baseline.   Psychiatric:         Behavior: Behavior normal.       Home Medications:   No current facility-administered medications on file prior to encounter.     Current Outpatient Medications on File Prior to Encounter   Medication Sig Dispense Refill    allopurinoL (ZYLOPRIM) 300 MG tablet Take 1 tablet (300 mg total) by mouth once daily. 90 tablet 3    co-enzyme Q-10 30 mg capsule Take 30 mg by mouth 3 (three) times daily.      digoxin (LANOXIN) 125 mcg tablet Take 0.125 mg by mouth once daily.      multivitamin capsule Take 1 capsule by mouth once daily.      rivaroxaban (XARELTO) 20 mg Tab Take 20 mg by mouth daily with dinner or evening meal.      rosuvastatin (CRESTOR) 5 MG tablet Take 1 tablet (5 mg total) by mouth once daily. (Patient taking differently: Take 5 mg by mouth every other day.) 90 tablet 1    zinc gluconate 50 mg tablet Take 50 mg by mouth once daily.      albuterol-ipratropium (DUO-NEB) 2.5 mg-0.5 mg/3 mL nebulizer solution Take 3 mLs by nebulization every 6 (six) hours as needed for Wheezing. Rescue 75 mL 3    sildenafiL (VIAGRA) 50 MG tablet Take 1 tablet (50 mg total) by mouth daily as needed for Erectile Dysfunction. 30 tablet 0     Current Schedule Inpatient Medications:   albuterol-ipratropium  3 mL Nebulization Q6H    allopurinoL  300 mg Oral Daily    cetirizine  10 mg Oral Daily    diltiaZEM  60 mg Oral Q8H    fluticasone propionate  2 spray Each Nostril BID    rivaroxaban  20 mg Oral Daily with dinner     Continuous Infusions:   dilTIAZem  0-15 mg/hr Intravenous Continuous 5 mL/hr at 09/06/24 1335 5 mg/hr at 09/06/24 1335     Assessment:   PAF- Now AF CVR    - On Xarelto for Stroke Risk Reduction    - Troponin Values Normal  Hypertension  Hyperlipidemia  Alcohol Use  Bronchitis  Gout  Elevated BMI  Renal Stone    - 6 mm nonobstructing stone is seen in the upper pole of the left kidney.  Leukocytosis    - COVID-19/Flu  "Testing Negative    Plan:   Plan:  Transition to Cardizem  Mg PO Daily  Continue Xarelto 20 Mg PO Daily  Check TSH  Lopressor IV PRN HR > 120 Sustained  Recommend Limiting Alcohol Use. Consider Outpatient Sleep Study.  Follow up with Dr. Kirkland Outpatient     Thank you for your consult.     BRUNO Amezcua  Cardiology  [facility] - 6th Floor Medical Telemetry  09/07/2024                           --------------   CARDIOLOGY ATTENDING ADDENDUM   ---------------      Cardiology Attending  I evaluated Adammarina Jean Baptiste.  The patient is a 74 y.o. male with:   Chief Complaint   Patient presents with    Cough    Nasal Congestion         ROS    GEN   No fever  No weakness    RESP  No SOB  + wheezing  SKIN  No pruritus  + rash on right calf    CARD  No CP  No palpitations        VASC  No cyanosis  No claudication  HEM   No adenopathy  No bleeding  GI  No heart burn  No melena  NEURO  No dizziness  No syncope       Scheduled Medications   albuterol-ipratropium  3 mL Nebulization Q6H    allopurinoL  300 mg Oral Daily    cetirizine  10 mg Oral Daily    diltiaZEM  60 mg Oral Q8H    fluticasone propionate  2 spray Each Nostril BID    rivaroxaban  20 mg Oral Daily with dinner       Blood pressure 138/78, pulse 66, temperature 97.5 °F (36.4 °C), temperature source Oral, resp. rate 18, height 6' 4" (1.93 m), weight 120.1 kg (264 lb 12.4 oz), SpO2 95%.  PE    GEN  No acute distress  Not ill appearing  NECK  Normal carotid upstroke and volume  Supple  CV  Normal rate  Regular rhythm  No murmur  PUL  No respiratory distress  No wheezing  No crackles  ABD  No distention  No tenderness  LOW EXT  No deformity  No edema  SKIN  No bruising  No rash  NEURO  Awake and alert    No severe weakness in upper or lower extremities.       Labs  Last BMP BMP  Lab Results   Component Value Date     09/05/2024    K 4.1 09/05/2024     09/05/2024    CO2 26 09/05/2024    BUN 9.3 09/05/2024    CREATININE 0.75 " 09/05/2024    CALCIUM 9.8 09/05/2024    EGFRNORACEVR >60 09/05/2024      Last CBC     Lab Results   Component Value Date    WBC 15.16 (H) 09/05/2024    HGB 16.0 09/05/2024    HCT 47.0 09/05/2024    MCV 98.3 (H) 09/05/2024     09/05/2024           BNP    Lab Results   Component Value Date    .0 (H) 09/05/2024    BNP 72.9 01/03/2023    .6 (H) 05/02/2022     Troponin   Lab Results   Component Value Date    TROPONINI 0.015 09/06/2024    TROPONINI 0.010 09/06/2024    TROPONINI 0.011 09/05/2024     Last lipids    Lab Results   Component Value Date    CHOL 227 (H) 04/05/2024    HDL 54 04/05/2024    .00 04/05/2024    TRIG 171 (H) 04/05/2024    TOTALCHOLEST 4 04/05/2024         Echo  No results found for this or any previous visit.    Stress test  No results found for this or any previous visit.    Coronary Angiogram  No results found for this or any previous visit.    Holter Monitor  No cardiac monitor results found for the past 12 months    Assessment/Plan  I agree with the assessment and plan as outlined above  Continue PO medications   F/U with Dr. Kirkland  Will be available as needed     Scot Nielsen MD  09/07/2024  10:42 AM  Cardiologist

## 2024-09-07 NOTE — NURSING
Nurses Note -- 4 Eyes      9/6/2024   8:54 PM      Skin assessed during: Q Shift Change      [x] No Altered Skin Integrity Present    []Prevention Measures Documented      [] Yes- Altered Skin Integrity Present or Discovered   [] LDA Added if Not in Epic (Describe Wound)   [] New Altered Skin Integrity was Present on Admit and Documented in LDA   [] Wound Image Taken    Wound Care Consulted? No    Attending Nurse:  Linda Fraire RN/Staff Member:

## 2024-09-07 NOTE — PLAN OF CARE
Pt educated about activity, diet, medications, follow up appointment with MD, and adverse S/S. Pt instructed to come to ER for any acute distress. Patient was given discharge instructions and verbalized understanding. IV and cardiac monitor was discontinued. Pt denies any acute distress at this time. Pt brought down via wheelchair and will be going home.

## 2024-09-08 LAB
OHS QRS DURATION: 86 MS
OHS QTC CALCULATION: 457 MS

## 2024-09-09 ENCOUNTER — TELEPHONE (OUTPATIENT)
Dept: INTERNAL MEDICINE | Facility: CLINIC | Age: 74
End: 2024-09-09
Payer: MEDICARE

## 2024-09-09 NOTE — TELEPHONE ENCOUNTER
----- Message from Azucena Israel sent at 9/6/2024  3:04 PM CDT -----  .Who Called: Daly with [facility]    Caller is requesting assistance/information from provider's office.    Symptoms (please be specific): N/A     How long has patient had these symptoms:  N/A    List of preferred pharmacies on file (remove unneeded): [unfilled]  If different, enter pharmacy into here including location and phone number: N/A    Preferred Method of Contact: Phone Call    Patient's Preferred Phone Number on File: [phone]     Best Call Back Number, if different: [phone] (Daly)    Additional Information: Called wanting to make Dr. Donahue aware his pt Mr. Jean Baptiste was transferred form the [facility] to [address] [place] [address] at [facility]. Please advise, thank you.

## 2024-09-10 ENCOUNTER — PATIENT OUTREACH (OUTPATIENT)
Dept: ADMINISTRATIVE | Facility: CLINIC | Age: 74
End: 2024-09-10
Payer: MEDICARE

## 2024-09-10 NOTE — PROGRESS NOTES
C3 nurse attempted to contact Adam Jean Baptiste  for a TCC post hospital discharge follow up call. No answer. Left voicemail with callback information. The patient has a scheduled HOSFU appointment with Omar Vasquez (Internal Medicine) on Monday Oct 7, 2024 1:00 PM

## 2024-09-11 NOTE — PROGRESS NOTES
C3 nurse spoke with Adam Jean Baptiste  for a TCC post hospital discharge follow up call. The patient has a scheduled [facility] appointment with Omar Vasquez (Internal Medicine) on Monday Oct 7, 2024 1:00 PM and with Non [facility] Cardiologist within 5-7 days post discharge date.

## 2024-09-11 NOTE — PROGRESS NOTES
C3 nurse attempted to contact Adam Jean Baptiste  for a TCC post hospital discharge follow up call. The patient is unable to conduct the call @ this time. The patient requested a callback.

## 2024-09-26 DIAGNOSIS — I10 PRIMARY HYPERTENSION: ICD-10-CM

## 2024-09-26 DIAGNOSIS — R73.01 ELEVATED FASTING GLUCOSE: ICD-10-CM

## 2024-09-26 DIAGNOSIS — Z00.00 WELLNESS EXAMINATION: ICD-10-CM

## 2024-09-26 DIAGNOSIS — R53.83 FATIGUE, UNSPECIFIED TYPE: ICD-10-CM

## 2024-09-26 DIAGNOSIS — E78.2 MIXED HYPERLIPIDEMIA: Primary | ICD-10-CM

## 2024-09-26 DIAGNOSIS — Z12.5 SCREENING FOR PROSTATE CANCER: ICD-10-CM

## 2024-09-30 ENCOUNTER — TELEPHONE (OUTPATIENT)
Dept: INTERNAL MEDICINE | Facility: CLINIC | Age: 74
End: 2024-09-30
Payer: MEDICARE

## 2024-09-30 NOTE — TELEPHONE ENCOUNTER
"----- Message from Nurse Shaw sent at 9/30/2024  7:41 AM CDT -----  Regarding: Dr. Donahue 10/07/2024 WEllness 1:00pm  Are there any outstanding tasks in chart? No, but needs FASTING labs, TO BE DONE AT  "[facility]" or lab location of choice PRIOR to appt    Is there any documentation of tasks? no    Has the pt seen another physician, been to ER, UCC, or admitted to hospital since last visit?    Has the pt done blood work or imaging since last visit?    5. PLEASE HAVE PATIENT BRING MEDICATION LIST OR BOTTLES TO EVERY OFFICE VISIT  "

## 2024-10-01 ENCOUNTER — LAB VISIT (OUTPATIENT)
Dept: LAB | Facility: HOSPITAL | Age: 74
End: 2024-10-01
Attending: INTERNAL MEDICINE
Payer: MEDICARE

## 2024-10-01 DIAGNOSIS — E78.2 MIXED HYPERLIPIDEMIA: ICD-10-CM

## 2024-10-01 DIAGNOSIS — R73.01 ELEVATED FASTING GLUCOSE: ICD-10-CM

## 2024-10-01 DIAGNOSIS — R53.83 FATIGUE, UNSPECIFIED TYPE: ICD-10-CM

## 2024-10-01 DIAGNOSIS — Z00.00 WELLNESS EXAMINATION: ICD-10-CM

## 2024-10-01 DIAGNOSIS — I10 PRIMARY HYPERTENSION: ICD-10-CM

## 2024-10-01 DIAGNOSIS — Z12.5 SCREENING FOR PROSTATE CANCER: ICD-10-CM

## 2024-10-01 LAB
ALBUMIN SERPL-MCNC: 3.7 G/DL (ref 3.4–4.8)
ALBUMIN/GLOB SERPL: 1 RATIO (ref 1.1–2)
ALP SERPL-CCNC: 50 UNIT/L (ref 40–150)
ALT SERPL-CCNC: 28 UNIT/L (ref 0–55)
ANION GAP SERPL CALC-SCNC: 8 MEQ/L
AST SERPL-CCNC: 21 UNIT/L (ref 5–34)
BASOPHILS # BLD AUTO: 0.05 X10(3)/MCL
BASOPHILS NFR BLD AUTO: 0.6 %
BILIRUB SERPL-MCNC: 1.2 MG/DL
BUN SERPL-MCNC: 8.4 MG/DL (ref 8.4–25.7)
CALCIUM SERPL-MCNC: 9.5 MG/DL (ref 8.8–10)
CHLORIDE SERPL-SCNC: 104 MMOL/L (ref 98–107)
CHOLEST SERPL-MCNC: 163 MG/DL
CHOLEST/HDLC SERPL: 3 {RATIO} (ref 0–5)
CO2 SERPL-SCNC: 28 MMOL/L (ref 23–31)
CREAT SERPL-MCNC: 0.72 MG/DL (ref 0.73–1.18)
CREAT/UREA NIT SERPL: 12
EOSINOPHIL # BLD AUTO: 0.15 X10(3)/MCL (ref 0–0.9)
EOSINOPHIL NFR BLD AUTO: 1.8 %
ERYTHROCYTE [DISTWIDTH] IN BLOOD BY AUTOMATED COUNT: 12.8 % (ref 11.5–17)
EST. AVERAGE GLUCOSE BLD GHB EST-MCNC: 122.6 MG/DL
GFR SERPLBLD CREATININE-BSD FMLA CKD-EPI: >60 ML/MIN/1.73/M2
GLOBULIN SER-MCNC: 3.7 GM/DL (ref 2.4–3.5)
GLUCOSE SERPL-MCNC: 124 MG/DL (ref 82–115)
HBA1C MFR BLD: 5.9 %
HCT VFR BLD AUTO: 43.3 % (ref 42–52)
HDLC SERPL-MCNC: 51 MG/DL (ref 35–60)
HGB BLD-MCNC: 14.7 G/DL (ref 14–18)
IMM GRANULOCYTES # BLD AUTO: 0.03 X10(3)/MCL (ref 0–0.04)
IMM GRANULOCYTES NFR BLD AUTO: 0.4 %
LDLC SERPL CALC-MCNC: 91 MG/DL (ref 50–140)
LYMPHOCYTES # BLD AUTO: 2.02 X10(3)/MCL (ref 0.6–4.6)
LYMPHOCYTES NFR BLD AUTO: 24.5 %
MCH RBC QN AUTO: 32.8 PG (ref 27–31)
MCHC RBC AUTO-ENTMCNC: 33.9 G/DL (ref 33–36)
MCV RBC AUTO: 96.7 FL (ref 80–94)
MONOCYTES # BLD AUTO: 0.93 X10(3)/MCL (ref 0.1–1.3)
MONOCYTES NFR BLD AUTO: 11.3 %
NEUTROPHILS # BLD AUTO: 5.05 X10(3)/MCL (ref 2.1–9.2)
NEUTROPHILS NFR BLD AUTO: 61.4 %
NRBC BLD AUTO-RTO: 0 %
PLATELET # BLD AUTO: 168 X10(3)/MCL (ref 130–400)
PMV BLD AUTO: 9.7 FL (ref 7.4–10.4)
POTASSIUM SERPL-SCNC: 4.1 MMOL/L (ref 3.5–5.1)
PROT SERPL-MCNC: 7.4 GM/DL (ref 5.8–7.6)
PSA SERPL-MCNC: 0.96 NG/ML
RBC # BLD AUTO: 4.48 X10(6)/MCL (ref 4.7–6.1)
SODIUM SERPL-SCNC: 140 MMOL/L (ref 136–145)
TRIGL SERPL-MCNC: 105 MG/DL (ref 34–140)
TSH SERPL-ACNC: 1.34 UIU/ML (ref 0.35–4.94)
VLDLC SERPL CALC-MCNC: 21 MG/DL
WBC # BLD AUTO: 8.23 X10(3)/MCL (ref 4.5–11.5)

## 2024-10-01 PROCEDURE — 84443 ASSAY THYROID STIM HORMONE: CPT

## 2024-10-01 PROCEDURE — 80061 LIPID PANEL: CPT

## 2024-10-01 PROCEDURE — 36415 COLL VENOUS BLD VENIPUNCTURE: CPT

## 2024-10-01 PROCEDURE — 84153 ASSAY OF PSA TOTAL: CPT

## 2024-10-01 PROCEDURE — 80053 COMPREHEN METABOLIC PANEL: CPT

## 2024-10-01 PROCEDURE — 85025 COMPLETE CBC W/AUTO DIFF WBC: CPT

## 2024-10-01 PROCEDURE — 83036 HEMOGLOBIN GLYCOSYLATED A1C: CPT

## 2024-10-07 ENCOUNTER — OFFICE VISIT (OUTPATIENT)
Dept: INTERNAL MEDICINE | Facility: CLINIC | Age: 74
End: 2024-10-07
Payer: MEDICARE

## 2024-10-07 VITALS
OXYGEN SATURATION: 98 % | BODY MASS INDEX: 34.1 KG/M2 | SYSTOLIC BLOOD PRESSURE: 130 MMHG | HEART RATE: 75 BPM | DIASTOLIC BLOOD PRESSURE: 78 MMHG | WEIGHT: 280 LBS | HEIGHT: 76 IN

## 2024-10-07 DIAGNOSIS — I48.11 LONGSTANDING PERSISTENT ATRIAL FIBRILLATION: ICD-10-CM

## 2024-10-07 DIAGNOSIS — N52.9 ERECTILE DYSFUNCTION, UNSPECIFIED ERECTILE DYSFUNCTION TYPE: ICD-10-CM

## 2024-10-07 DIAGNOSIS — Z00.00 MEDICARE ANNUAL WELLNESS VISIT, SUBSEQUENT: Primary | ICD-10-CM

## 2024-10-07 DIAGNOSIS — R60.0 LOCALIZED EDEMA: ICD-10-CM

## 2024-10-07 DIAGNOSIS — E78.00 ELEVATED CHOLESTEROL: ICD-10-CM

## 2024-10-07 PROCEDURE — 1123F ACP DISCUSS/DSCN MKR DOCD: CPT | Mod: CPTII,,, | Performed by: INTERNAL MEDICINE

## 2024-10-07 PROCEDURE — 1126F AMNT PAIN NOTED NONE PRSNT: CPT | Mod: CPTII,,, | Performed by: INTERNAL MEDICINE

## 2024-10-07 PROCEDURE — 3078F DIAST BP <80 MM HG: CPT | Mod: CPTII,,, | Performed by: INTERNAL MEDICINE

## 2024-10-07 PROCEDURE — 1101F PT FALLS ASSESS-DOCD LE1/YR: CPT | Mod: CPTII,,, | Performed by: INTERNAL MEDICINE

## 2024-10-07 PROCEDURE — 1160F RVW MEDS BY RX/DR IN RCRD: CPT | Mod: CPTII,,, | Performed by: INTERNAL MEDICINE

## 2024-10-07 PROCEDURE — 3075F SYST BP GE 130 - 139MM HG: CPT | Mod: CPTII,,, | Performed by: INTERNAL MEDICINE

## 2024-10-07 PROCEDURE — 1159F MED LIST DOCD IN RCRD: CPT | Mod: CPTII,,, | Performed by: INTERNAL MEDICINE

## 2024-10-07 PROCEDURE — G0439 PPPS, SUBSEQ VISIT: HCPCS | Mod: ,,, | Performed by: INTERNAL MEDICINE

## 2024-10-07 PROCEDURE — 3288F FALL RISK ASSESSMENT DOCD: CPT | Mod: CPTII,,, | Performed by: INTERNAL MEDICINE

## 2024-10-07 RX ORDER — SILDENAFIL 50 MG/1
50 TABLET, FILM COATED ORAL DAILY PRN
Qty: 30 TABLET | Refills: 0 | Status: SHIPPED | OUTPATIENT
Start: 2024-10-07 | End: 2024-10-07 | Stop reason: SDUPTHER

## 2024-10-07 RX ORDER — METOPROLOL SUCCINATE 25 MG/1
25 TABLET, EXTENDED RELEASE ORAL 2 TIMES DAILY
COMMUNITY
Start: 2024-09-20

## 2024-10-07 RX ORDER — SILDENAFIL 50 MG/1
50 TABLET, FILM COATED ORAL DAILY PRN
Qty: 30 TABLET | Refills: 0 | Status: SHIPPED | OUTPATIENT
Start: 2024-10-07 | End: 2025-10-07

## 2024-10-07 NOTE — PROGRESS NOTES
Patient ID: Adam Jean Baptiste is a 74 y.o. male.  Chief Complaint: Medicare AWV (Wellness. Discuss labs (drawn 10/01/2024))    Patient Care Team:  Omar Donahue MD as PCP - General (Internal Medicine)  Omar Donahue MD Noel, Jacque F III, MD as Consulting Physician (Gastroenterology)  [facility], [facility] (Physical Therapy)     HPI:   Patient presents here today for above reason.   Recently admitted to the hospital back in September 5th due to elevated blood pressure and AFib, patient was consulted to the cardiology group and added Cardizem drip now on p.o., his biggest concern is lower extremity edema.  Patient reassured side effects of the calcium channel blocker, chart has been reviewed most likely PVD, advised to wear Arron hoses or high socks upon awakening.  Low-salt diet as well   Had seen Dr. Wells cardiology and has a follow-up coming up   No chest pain no syncope no lightheadedness   Hemoglobin A1c at 5.9, the rest of the blood were all within normal limits with a excellent lipid panel    The patient's Health Maintenance was reviewed and the following appears to be due at this time:   Health Maintenance Due   Topic Date Due    Shingles Vaccine (1 of 2) Never done    Pneumococcal Vaccines (Age 65+) (2 of 2 - PPSV23 or PCV20) 09/10/2020    RSV Vaccine (Age 60+ and Pregnant patients) (1 - 1-dose 75+ series) Never done    Influenza Vaccine (1) 09/01/2024    COVID-19 Vaccine (4 - 2024-25 season) 09/01/2024        Past Medical History:  Past Medical History:   Diagnosis Date    A-fib     Gout, unspecified     HLD (hyperlipidemia)     HTN (hypertension)     Paroxysmal atrial fibrillation      Past Surgical History:   Procedure Laterality Date    ADENOIDECTOMY      COLONOSCOPY  12/06/2006    DILATION, AQUEOUS OUTFLOW CANAL, TRANSLUMINAL, WITHOUT DEVICE RETENTION Right 03/19/2024    Procedure: DILATION, AQUEOUS OUTFLOW CANAL, TRANSLUMINAL, WITHOUT DEVICE RETENTION;  Surgeon: Jil Chatterjee MD;   Location: [facility] OR;  Service: Ophthalmology;  Laterality: Right;    LAPAROSCOPIC CHOLECYSTECTOMY      PHACOEMULSIFICATION WITH INSERTION, I-STENT Right 03/19/2024    Procedure: PHACO WITH IOL, OMNI, HYDRUS - OD;  Surgeon: Jil Chatterjee MD;  Location: [facility] OR;  Service: Ophthalmology;  Laterality: Right;    TONSILLECTOMY       Review of patient's allergies indicates:  No Known Allergies  Current Outpatient Medications on File Prior to Visit   Medication Sig Dispense Refill    albuterol-ipratropium (DUO-NEB) 2.5 mg-0.5 mg/3 mL nebulizer solution Take 3 mLs by nebulization every 6 (six) hours as needed for Wheezing. Rescue 75 mL 3    albuterol-ipratropium (DUO-NEB) 2.5 mg-0.5 mg/3 mL nebulizer solution Take 3 mLs by nebulization every 6 (six) hours. Rescue 75 mL 0    allopurinoL (ZYLOPRIM) 300 MG tablet Take 1 tablet (300 mg total) by mouth once daily. 90 tablet 3    co-enzyme Q-10 30 mg capsule Take 30 mg by mouth 3 (three) times daily.      diltiaZEM (CARDIZEM) 60 MG tablet Take 1 tablet (60 mg total) by mouth every 8 (eight) hours. (Patient taking differently: Take 90 mg by mouth once daily. Take 2 tabs to equal 180mg daily) 90 tablet 11    metoprolol succinate (TOPROL-XL) 25 MG 24 hr tablet Take 25 mg by mouth 2 (two) times daily.      multivitamin capsule Take 1 capsule by mouth once daily.      rivaroxaban (XARELTO) 20 mg Tab Take 20 mg by mouth daily with dinner or evening meal.      rosuvastatin (CRESTOR) 5 MG tablet Take 1 tablet (5 mg total) by mouth once daily. 90 tablet 1    zinc gluconate 50 mg tablet Take 50 mg by mouth once daily.      [DISCONTINUED] sildenafiL (VIAGRA) 50 MG tablet Take 1 tablet (50 mg total) by mouth daily as needed for Erectile Dysfunction. 30 tablet 0    digoxin (LANOXIN) 125 mcg tablet Take 0.125 mg by mouth once daily. (Patient not taking: Reported on 10/7/2024)       No current facility-administered medications on file prior to visit.     Social History     Socioeconomic  "History    Marital status:    Tobacco Use    Smoking status: Never    Smokeless tobacco: Never   Substance and Sexual Activity    Alcohol use: Yes     Alcohol/week: 38.0 standard drinks of alcohol     Types: 24 Cans of beer, 14 Shots of liquor per week     Family History   Problem Relation Name Age of Onset    Hypertension Mother      Valvular heart disease Mother      Heart disease Father                             Opioid Screening: Patient medication list reviewed, patient is not taking prescription opioids. Patient is not using additional opioids than prescribed. Patient is at low risk of substance abuse based on this opioid use history.    ROS:   Negative other than what is mentioned in HPI    Vitals/PE:   /78 (BP Location: Left arm, Patient Position: Sitting)   Pulse 75   Ht 6' 4" (1.93 m)   Wt 127 kg (280 lb)   SpO2 98%   BMI 34.08 kg/m²   Physical Exam    General: Alert and oriented, No acute distress.   Eye: Normal conjunctiva without exudate.  HENMT: Normocephalic/AT, Normal hearing, Oral mucosa is moist and pink   Neck: No goiter visualized.   Respiratory: Lungs CTAB, Respirations are non-labored, Breath sounds are equal, Symmetrical chest wall expansion.  Cardiovascular: Normal rate, Regular rhythm, No murmur, No edema.   Gastrointestinal: Non-distended.   Genitourinary: Deferred.  Musculoskeletal: Normal ROM, Normal gait, No deformities or amputations.  Integumentary: Warm, Dry, Intact. No diaphoresis, or flushing.  Trace edema lower extremities more so on the right side.  Varicose veins are noticeable  Neurologic: No focal deficits, Cranial Nerves II-XII are grossly intact.   Psychiatric: Cooperative, Appropriate mood & affect, Normal judgment, Non-suicidal.         No data to display                  10/7/2024     1:00 PM 4/3/2024    10:00 AM 2/1/2024     1:00 PM 10/3/2023     2:00 PM 3/21/2023     9:40 AM 9/19/2022    10:00 AM   Fall Risk Assessment - Outpatient   Mobility Status " Ambulatory Ambulatory Ambulatory Ambulatory Ambulatory Ambulatory   Number of falls 0 0 0 0 1 with injury 0   Identified as fall risk False False False False True False                Assessment/Plan:   1. Medicare annual wellness visit, subsequent  Comments:  all labs review  all ok    2. Erectile dysfunction, unspecified erectile dysfunction type  Comments:  on viagra  Orders:  -     sildenafiL (VIAGRA) 50 MG tablet; Take 1 tablet (50 mg total) by mouth daily as needed for Erectile Dysfunction.  Dispense: 30 tablet; Refill: 0    3. Longstanding persistent atrial fibrillation  Comments:  rate ciontrolled    4. Elevated cholesterol  Comments:  lipid panel excellent    5. Localized edema  Comments:  most likely pvd  advice to wear galilea hoses  Overview:  most likely pvd  advice to wear galilea hoses          Recommendations:  Diet (healthy food choices, reduce portions and overall calorie intake)  Exercise 30-45 minutes at least 3x per week  Avoid excessive alcohol intake and tobacco use  Stay UTD with immunizations and preventative screenings   Yearly Labs     ..  Medications Ordered This Encounter   Medications    sildenafiL (VIAGRA) 50 MG tablet     Sig: Take 1 tablet (50 mg total) by mouth daily as needed for Erectile Dysfunction.     Dispense:  30 tablet     Refill:  0        ..No orders of the defined types were placed in this encounter.        I am having Adam Jean Baptiste maintain his rivaroxaban, multivitamin, co-enzyme Q-10, zinc gluconate, allopurinoL, rosuvastatin, albuterol-ipratropium, digoxin, diltiaZEM, albuterol-ipratropium, metoprolol succinate, and sildenafiL.    No orders of the defined types were placed in this encounter.      Education and counseling done face to face regarding medical conditions and plan. Contact office if new symptoms develop. Should any symptoms ever significantly worsen seek emergency medical attention/go to ER. Follow up at least yearly for wellness or sooner PRN. Nurse to call  patient with any results. The patient is receptive, expresses understanding and is agreeable to plan. All questions have been answered.      Advance Care Planning     Date: 10/07/2024    Living Will  During this visit, I engaged the family  in the voluntary advance care planning process.  The patient and I reviewed the role for advance directives and their purpose in directing future healthcare if the patient's unable to speak for him/herself.  At this point in time, the patient does have full decision-making capacity.  We discussed different extreme health states that he could experience, and reviewed what kind of medical care he would want in those situations.  The family communicated that if he were comatose and had little chance of a meaningful recovery, he would not want machines/life-sustaining treatments used. In addition to the above preference, other important end-of-life issues for the patient include . The patient has completed a living will to reflect these preferences.  I spent a total of 5 minutes engaging the patient in this advance care planning discussion.                 No follow-ups on file.

## 2024-10-31 DIAGNOSIS — E78.2 MIXED HYPERLIPIDEMIA: ICD-10-CM

## 2024-10-31 RX ORDER — ROSUVASTATIN CALCIUM 5 MG/1
5 TABLET, COATED ORAL
Qty: 90 TABLET | Refills: 3 | Status: SHIPPED | OUTPATIENT
Start: 2024-10-31

## 2024-11-05 ENCOUNTER — LAB VISIT (OUTPATIENT)
Dept: LAB | Facility: HOSPITAL | Age: 74
End: 2024-11-05
Attending: INTERNAL MEDICINE
Payer: MEDICARE

## 2024-11-05 DIAGNOSIS — Z12.5 SCREENING FOR PROSTATE CANCER: ICD-10-CM

## 2024-11-05 DIAGNOSIS — I10 PRIMARY HYPERTENSION: ICD-10-CM

## 2024-11-05 DIAGNOSIS — R53.83 FATIGUE, UNSPECIFIED TYPE: ICD-10-CM

## 2024-11-05 DIAGNOSIS — E78.2 MIXED HYPERLIPIDEMIA: ICD-10-CM

## 2024-11-05 DIAGNOSIS — Z00.00 WELLNESS EXAMINATION: Primary | ICD-10-CM

## 2024-11-05 DIAGNOSIS — R73.01 ELEVATED FASTING GLUCOSE: ICD-10-CM

## 2024-11-05 LAB
ANION GAP SERPL CALC-SCNC: 9 MEQ/L
BUN SERPL-MCNC: 10.9 MG/DL (ref 8.4–25.7)
CALCIUM SERPL-MCNC: 10 MG/DL (ref 8.8–10)
CHLORIDE SERPL-SCNC: 102 MMOL/L (ref 98–107)
CO2 SERPL-SCNC: 29 MMOL/L (ref 23–31)
CREAT SERPL-MCNC: 0.85 MG/DL (ref 0.72–1.25)
CREAT/UREA NIT SERPL: 13
GFR SERPLBLD CREATININE-BSD FMLA CKD-EPI: >60 ML/MIN/1.73/M2
GLUCOSE SERPL-MCNC: 128 MG/DL (ref 82–115)
MAGNESIUM SERPL-MCNC: 1.8 MG/DL (ref 1.6–2.6)
POTASSIUM SERPL-SCNC: 4.4 MMOL/L (ref 3.5–5.1)
SODIUM SERPL-SCNC: 140 MMOL/L (ref 136–145)

## 2024-11-05 PROCEDURE — 36415 COLL VENOUS BLD VENIPUNCTURE: CPT

## 2024-11-05 PROCEDURE — 83735 ASSAY OF MAGNESIUM: CPT

## 2024-11-05 PROCEDURE — 83880 ASSAY OF NATRIURETIC PEPTIDE: CPT

## 2024-11-05 PROCEDURE — 80048 BASIC METABOLIC PNL TOTAL CA: CPT

## 2024-11-06 LAB — NT-PROBNP SERPL IA-MCNC: 479 PG/ML

## 2024-12-13 ENCOUNTER — TELEPHONE (OUTPATIENT)
Dept: INTERNAL MEDICINE | Facility: CLINIC | Age: 74
End: 2024-12-13
Payer: MEDICARE

## 2024-12-13 DIAGNOSIS — K92.89 FISTULA OF DIGESTIVE SYSTEM: Primary | ICD-10-CM

## 2024-12-13 NOTE — TELEPHONE ENCOUNTER
----- Message from Cee sent at 12/13/2024  8:37 AM CST -----  .Type:  Patient Requesting Referral    Who Called:pt  Does the patient already have the specialty appointment scheduled?:no  If yes, what is the date of that appointment?:no  Referral to What Specialty:Dr Scot Ball  Reason for Referral: fibrillation  Does the patient want the referral with a specific physician?:Dr Scot Ball  Is the specialist an [facility]?:Dr Scot Ball  Patient Requesting a Response?:Yes  Would the patient rather a call back or a response via [facility]?   Best Call Back Number:[phone]   Additional Information: Please send referral TO Dr Scot Ball

## 2024-12-13 NOTE — TELEPHONE ENCOUNTER
Spoke to patient to clarify referral, Diagnosis and Specialist does not Match.  Fibrillation is related to HEART therefore needing a Cardiology Referral    Dr. Scot Ball is a Colorectal Surgeion, patient is having related issues of FISTULA, not Fibrillation     Referral Sent

## 2025-01-02 ENCOUNTER — OFFICE VISIT (OUTPATIENT)
Dept: SURGICAL ONCOLOGY | Facility: CLINIC | Age: 75
End: 2025-01-02
Payer: MEDICARE

## 2025-01-02 VITALS
SYSTOLIC BLOOD PRESSURE: 147 MMHG | BODY MASS INDEX: 32.41 KG/M2 | DIASTOLIC BLOOD PRESSURE: 107 MMHG | WEIGHT: 266.19 LBS | HEART RATE: 59 BPM | HEIGHT: 76 IN | OXYGEN SATURATION: 95 %

## 2025-01-02 DIAGNOSIS — K92.89 FISTULA OF DIGESTIVE SYSTEM: Primary | ICD-10-CM

## 2025-01-02 DIAGNOSIS — K62.5 RECTAL BLEEDING: ICD-10-CM

## 2025-01-02 PROCEDURE — 1160F RVW MEDS BY RX/DR IN RCRD: CPT | Mod: CPTII,S$GLB,, | Performed by: COLON & RECTAL SURGERY

## 2025-01-02 PROCEDURE — 3077F SYST BP >= 140 MM HG: CPT | Mod: CPTII,S$GLB,, | Performed by: COLON & RECTAL SURGERY

## 2025-01-02 PROCEDURE — 3080F DIAST BP >= 90 MM HG: CPT | Mod: CPTII,S$GLB,, | Performed by: COLON & RECTAL SURGERY

## 2025-01-02 PROCEDURE — 1101F PT FALLS ASSESS-DOCD LE1/YR: CPT | Mod: CPTII,S$GLB,, | Performed by: COLON & RECTAL SURGERY

## 2025-01-02 PROCEDURE — 3288F FALL RISK ASSESSMENT DOCD: CPT | Mod: CPTII,S$GLB,, | Performed by: COLON & RECTAL SURGERY

## 2025-01-02 PROCEDURE — 1159F MED LIST DOCD IN RCRD: CPT | Mod: CPTII,S$GLB,, | Performed by: COLON & RECTAL SURGERY

## 2025-01-02 PROCEDURE — 99999 PR PBB SHADOW E&M-EST. PATIENT-LVL IV: CPT | Mod: PBBFAC,,, | Performed by: COLON & RECTAL SURGERY

## 2025-01-02 PROCEDURE — 99204 OFFICE O/P NEW MOD 45 MIN: CPT | Mod: S$GLB,,, | Performed by: COLON & RECTAL SURGERY

## 2025-01-02 RX ORDER — HYDROCORTISONE ACETATE 25 MG/1
25 SUPPOSITORY RECTAL 2 TIMES DAILY
Qty: 20 SUPPOSITORY | Refills: 0 | Status: SHIPPED | OUTPATIENT
Start: 2025-01-02 | End: 2025-01-12

## 2025-01-02 RX ORDER — EMPAGLIFLOZIN 10 MG/1
TABLET, FILM COATED ORAL
COMMUNITY
Start: 2024-10-14

## 2025-01-03 NOTE — PROGRESS NOTES
Patient ID: [id number]     Chief Complaint: Fistula of digestive system      HPI:     Adam Jean Bapitste is a 74 y.o. male known to me status post intersphincteric fistulotomy 02/01/21 here today for possible recurrence.  He describes intermittent randomly occurring bright red blood per rectum after passing a hard stool.  He takes Xarelto for atrial fibrillation.  He denies rectal pain.  He admits to sitting on the toilet for long periods of time.  His last colonoscopy was more than 10 years ago.  His brother recently experienced a perforation during colonoscopy, so he is reluctant to have another 1.    Past Medical History:  has a past medical history of A-fib, Gout, unspecified, HLD (hyperlipidemia), HTN (hypertension), and Paroxysmal atrial fibrillation.    Surgical History:  has a past surgical history that includes Tonsillectomy; Laparoscopic cholecystectomy; Colonoscopy (12/06/2006); Adenoidectomy; phacoemulsification with insertion, i-stent (Right, 03/19/2024); and dilation, aqueous outflow canal, transluminal, without device retention (Right, 03/19/2024).    Family History: family history includes Heart disease in his father; Hypertension in his mother; Valvular heart disease in his mother.    Social History:  reports that he has never smoked. He has never used smokeless tobacco. He reports current alcohol use of about 38.0 standard drinks of alcohol per week.    Current Outpatient Medications   Medication Instructions    albuterol-ipratropium (DUO-NEB) 2.5 mg-0.5 mg/3 mL nebulizer solution 3 mLs, Nebulization, Every 6 hours PRN, Rescue    albuterol-ipratropium (DUO-NEB) 2.5 mg-0.5 mg/3 mL nebulizer solution 3 mLs, Nebulization, Every 6 hours, Rescue    allopurinoL (ZYLOPRIM) 300 mg, Oral, Daily    co-enzyme Q-10 30 mg, 3 times daily    diltiaZEM (CARDIZEM) 60 mg, Oral, Every 8 hours    hydrocortisone (ANUSOL-HC) 25 mg, Rectal, 2 times daily    JARDIANCE 10 mg tablet     metoprolol succinate (TOPROL-XL)  "25 mg, 2 times daily    multivitamin capsule 1 capsule, Daily    rivaroxaban (XARELTO) 20 mg, With dinner    rosuvastatin (CRESTOR) 5 mg, Oral    sildenafiL (VIAGRA) 50 mg, Oral, Daily PRN    zinc gluconate 50 mg, Daily       Patient has No Known Allergies.     Patient Care Team:  Omar Donahue MD as PCP - General (Internal Medicine)  Omar Donahue MD Noel, Jacque F III, MD as Consulting Physician (Gastroenterology)  [facility] (Physical Therapy)       Subjective:     Review of Systems    12 point review of systems conducted, negative except as stated in the history of present illness. See HPI for details.      Objective:     Visit Vitals  BP (!) 147/107 (BP Location: Right arm, Patient Position: Sitting)   Pulse (!) 59   Ht 6' 4" (1.93 m)   Wt 120.7 kg (266 lb 3.2 oz)   SpO2 95%   BMI 32.40 kg/m²       Physical Exam    General: Alert and oriented, No acute distress.  Head: Normocephalic, Atraumatic.  Eye: Pupils are equal and round, Extraocular movements are intact, Sclera non-icteric.  Ears/Nose/Throat: Normal, Mucosa moist, Clear.  Respiratory: No wheezes, Non-labored respirations, Symmetrical chest wall expansion.  Cardiovascular: Regular rate.  Gastrointestinal: Soft, Non-tender, Non-distended, Normal bowel sounds, No palpable masses.  Rectal: Small RP hemorrhoid prolapse.  NST with enlarged RP hemorrhoid.  Integumentary: Warm, Dry, Intact, No rashes.  Extremities: No edema.  Neurologic: No focal deficits.  Psychiatric: Normal interaction, Coherent speech, Euthymic mood, Appropriate affect.       Labs Reviewed:     Chemistry:  Lab Results   Component Value Date     11/05/2024    K 4.4 11/05/2024    BUN 10.9 11/05/2024    CREATININE 0.85 11/05/2024    EGFRNORACEVR >60 11/05/2024    GLUCOSE 128 (H) 11/05/2024    CALCIUM 10.0 11/05/2024    ALKPHOS 50 10/01/2024    LABPROT 7.4 10/01/2024    ALBUMIN 3.7 10/01/2024    BILIDIR 0.3 05/02/2022    IBILI 0.40 05/02/2022    AST 21 " 10/01/2024    ALT 28 10/01/2024    MG 1.80 11/05/2024        Hematology:  Lab Results   Component Value Date    WBC 8.23 10/01/2024    HGB 14.7 10/01/2024    HCT 43.3 10/01/2024     10/01/2024         Assessment:       ICD-10-CM ICD-9-CM   1. Rectal bleeding  K62.5 569.3        Plan:     I suspect his bleeding is hemorrhoidal in nature but recommend colonoscopy prior to hemorrhoidectomy.  He is reluctant to proceed with either procedure, so I recommend lifestyle modification (especially decreased toilet time) and Anusol suppositories.  Patient will call me when he is ready to schedule his procedure.      No follow-ups on file. In addition to their scheduled follow up, the patient has also been instructed to follow up on as needed basis.     Future Appointments   Date Time Provider Department Center   10/9/2025 11:00 AM Omar Donahue MD [facility]        Scot Ball MD

## 2025-01-07 DIAGNOSIS — M10.9 GOUT, UNSPECIFIED CAUSE, UNSPECIFIED CHRONICITY, UNSPECIFIED SITE: ICD-10-CM

## 2025-01-08 RX ORDER — ALLOPURINOL 300 MG/1
300 TABLET ORAL
Qty: 90 TABLET | Refills: 3 | Status: SHIPPED | OUTPATIENT
Start: 2025-01-08

## 2025-04-26 DIAGNOSIS — N52.9 ERECTILE DYSFUNCTION, UNSPECIFIED ERECTILE DYSFUNCTION TYPE: ICD-10-CM

## 2025-04-28 RX ORDER — SILDENAFIL 50 MG/1
50 TABLET, FILM COATED ORAL DAILY PRN
Qty: 30 TABLET | Refills: 11 | Status: SHIPPED | OUTPATIENT
Start: 2025-04-28 | End: 2026-04-28

## 2025-06-23 ENCOUNTER — OFFICE VISIT (OUTPATIENT)
Dept: INTERNAL MEDICINE | Facility: CLINIC | Age: 75
End: 2025-06-23
Payer: MEDICARE

## 2025-06-23 VITALS
HEIGHT: 76 IN | WEIGHT: 266.13 LBS | DIASTOLIC BLOOD PRESSURE: 80 MMHG | SYSTOLIC BLOOD PRESSURE: 130 MMHG | OXYGEN SATURATION: 96 % | HEART RATE: 61 BPM | BODY MASS INDEX: 32.41 KG/M2

## 2025-06-23 DIAGNOSIS — J02.9 SORE THROAT: ICD-10-CM

## 2025-06-23 DIAGNOSIS — I50.32 CHRONIC DIASTOLIC (CONGESTIVE) HEART FAILURE: ICD-10-CM

## 2025-06-23 DIAGNOSIS — J40 WHEEZY BRONCHITIS: Primary | ICD-10-CM

## 2025-06-23 PROCEDURE — 1159F MED LIST DOCD IN RCRD: CPT | Mod: CPTII,,, | Performed by: NURSE PRACTITIONER

## 2025-06-23 PROCEDURE — 1126F AMNT PAIN NOTED NONE PRSNT: CPT | Mod: CPTII,,, | Performed by: NURSE PRACTITIONER

## 2025-06-23 PROCEDURE — 1160F RVW MEDS BY RX/DR IN RCRD: CPT | Mod: CPTII,,, | Performed by: NURSE PRACTITIONER

## 2025-06-23 PROCEDURE — 1101F PT FALLS ASSESS-DOCD LE1/YR: CPT | Mod: CPTII,,, | Performed by: NURSE PRACTITIONER

## 2025-06-23 PROCEDURE — 3075F SYST BP GE 130 - 139MM HG: CPT | Mod: CPTII,,, | Performed by: NURSE PRACTITIONER

## 2025-06-23 PROCEDURE — 99214 OFFICE O/P EST MOD 30 MIN: CPT | Mod: ,,, | Performed by: NURSE PRACTITIONER

## 2025-06-23 PROCEDURE — 3079F DIAST BP 80-89 MM HG: CPT | Mod: CPTII,,, | Performed by: NURSE PRACTITIONER

## 2025-06-23 PROCEDURE — 3288F FALL RISK ASSESSMENT DOCD: CPT | Mod: CPTII,,, | Performed by: NURSE PRACTITIONER

## 2025-06-23 RX ORDER — LATANOPROST 50 UG/ML
1 SOLUTION/ DROPS OPHTHALMIC NIGHTLY
COMMUNITY

## 2025-06-23 RX ORDER — IBUPROFEN 100 MG/5ML
1000 SUSPENSION, ORAL (FINAL DOSE FORM) ORAL DAILY
COMMUNITY

## 2025-06-23 RX ORDER — CLONIDINE HYDROCHLORIDE 0.1 MG/1
0.1 TABLET ORAL
COMMUNITY

## 2025-06-23 RX ORDER — ALBUTEROL SULFATE 0.83 MG/ML
2.5 SOLUTION RESPIRATORY (INHALATION) EVERY 6 HOURS PRN
COMMUNITY

## 2025-06-23 RX ORDER — DILTIAZEM HYDROCHLORIDE 180 MG/1
180 CAPSULE, COATED, EXTENDED RELEASE ORAL DAILY
COMMUNITY
Start: 2025-05-01

## 2025-06-23 RX ORDER — AZITHROMYCIN 250 MG/1
TABLET, FILM COATED ORAL
Qty: 6 TABLET | Refills: 0 | Status: SHIPPED | OUTPATIENT
Start: 2025-06-23 | End: 2025-06-28

## 2025-06-23 RX ORDER — CHOLECALCIFEROL (VITAMIN D3) 25 MCG
1000 TABLET ORAL DAILY
COMMUNITY

## 2025-06-23 RX ORDER — DORZOLAMIDE HCL 20 MG/ML
1 SOLUTION/ DROPS OPHTHALMIC 3 TIMES DAILY
COMMUNITY

## 2025-06-23 NOTE — PROGRESS NOTES
"Subjective:      Patient ID: Adam Jean Baptiste is a 74 y.o. male.    Chief Complaint: Chest Congestion and Cough      HPI: Patient presents with c/o intermittent chest congestion and productive cough with yellow/green mucous lasting a few days then resolving for the last 8 months. Hospitalized for Bronchitis last year. Symptoms worse in AM. Does take 2 mg guaifenesin/dextromethorphan and Albuterol nebulizer for symptom management. Has MEIER, occasional sore throat, runny nose, and watery eyes. Denies CP or sinus pressure.     Review of patient's allergies indicates:  No Known Allergies    Review of Systems  Constitutional: No fever, No chills, No sweats, No fatigue, No weight loss.  Eyes: No blurring.  Ear/Nose/Mouth/Throat: nasal congestion, No vertigo. PND  Respiratory: No shortness of breath, cough with green/gray sputum production, occ wheezing, No exertional dyspnea.   Cardiovascular: No chest pain, No palpitations, No peripheral edema.    Objective:   Visit Vitals  /80 (BP Location: Right arm, Patient Position: Sitting)   Pulse 61   Ht 6' 4" (1.93 m)   Wt 120.7 kg (266 lb 1.5 oz)   SpO2 96%   BMI 32.39 kg/m²     The patient's weight trend is below:   Wt Readings from Last 4 Encounters:   06/23/25 120.7 kg (266 lb 1.5 oz)   01/02/25 120.7 kg (266 lb 3.2 oz)   10/07/24 127 kg (280 lb)   09/06/24 120.1 kg (264 lb 12.4 oz)        Physical Exam  Vitals and nursing note reviewed.   Constitutional:       General: He is not in acute distress.     Appearance: Normal appearance. He is normal weight. He is not ill-appearing, toxic-appearing or diaphoretic.   HENT:      Head: Normocephalic and atraumatic.      Right Ear: Tympanic membrane, ear canal and external ear normal.      Left Ear: Tympanic membrane, ear canal and external ear normal.      Nose: Congestion present. No rhinorrhea.      Mouth/Throat:      Mouth: Mucous membranes are moist.      Pharynx: Oropharynx is clear. No oropharyngeal exudate or " posterior oropharyngeal erythema.   Neck:      Vascular: No carotid bruit.   Cardiovascular:      Rate and Rhythm: Normal rate and regular rhythm.      Pulses: Normal pulses.      Heart sounds: No murmur heard.     No friction rub. No gallop.   Pulmonary:      Effort: Pulmonary effort is normal. No respiratory distress.      Breath sounds: No stridor. Wheezing (fine, posteriorly) present. No rhonchi or rales.   Musculoskeletal:         General: Normal range of motion.      Cervical back: Normal range of motion and neck supple. No rigidity or tenderness.   Lymphadenopathy:      Cervical: No cervical adenopathy.   Skin:     General: Skin is warm and dry.   Neurological:      General: No focal deficit present.      Mental Status: He is alert and oriented to person, place, and time. Mental status is at baseline.      Motor: No weakness.      Coordination: Coordination normal.      Gait: Gait normal.   Psychiatric:         Mood and Affect: Mood normal.         Behavior: Behavior normal.         Thought Content: Thought content normal.         Judgment: Judgment normal.         Assessment/Plan:   1. Wheezy bronchitis  Assessment & Plan:  RX Z-pack  Add Claritin or Allegra daily  Inc fluids  Follow up as needed    Orders:  -     azithromycin (Z-DAVIDSON) 250 MG tablet; Take 2 tablets by mouth on day 1; Take 1 tablet by mouth on days 2-5  Dispense: 6 tablet; Refill: 0    2. Sore throat  Assessment & Plan:  Inc fluids  Tylenol as needed    Orders:  -     azithromycin (Z-DAVIDSON) 250 MG tablet; Take 2 tablets by mouth on day 1; Take 1 tablet by mouth on days 2-5  Dispense: 6 tablet; Refill: 0    3. Chronic diastolic (congestive) heart failure  Assessment & Plan:  Stable on current dosages of metoprolol 25mg BID and Cardizem 180mg daily  Continue current management per CV  Low salt diet  Monitor weight           Medication List with Changes/Refills   New Medications    AZITHROMYCIN (Z-DAVIDSON) 250 MG TABLET    Take 2 tablets by mouth on  day 1; Take 1 tablet by mouth on days 2-5   Current Medications    ALBUTEROL (PROVENTIL) 2.5 MG /3 ML (0.083 %) NEBULIZER SOLUTION    Take 2.5 mg by nebulization every 6 (six) hours as needed for Wheezing. Rescue    ALLOPURINOL (ZYLOPRIM) 300 MG TABLET    TAKE 1 TABLET ONE TIME DAILY    ASCORBIC ACID, VITAMIN C, (VITAMIN C) 1000 MG TABLET    Take 1,000 mg by mouth once daily.    CLONIDINE (CATAPRES) 0.1 MG TABLET    Take 0.1 mg by mouth.    CO-ENZYME Q-10 30 MG CAPSULE    Take 30 mg by mouth 3 (three) times daily.    DILTIAZEM (CARDIZEM CD) 180 MG 24 HR CAPSULE    Take 180 mg by mouth once daily.    DORZOLAMIDE (TRUSOPT) 2 % OPHTHALMIC SOLUTION    1 drop 3 (three) times daily.    GUAIFENESIN/DEXTROMETHORPHAN (CORICIDIN HBP CHEST COREY-COUGH ORAL)    Take by mouth.    JARDIANCE 10 MG TABLET        LATANOPROST 0.005 % OPHTHALMIC SOLUTION    1 drop every evening.    METOPROLOL SUCCINATE (TOPROL-XL) 25 MG 24 HR TABLET    Take 25 mg by mouth 2 (two) times daily.    MULTIVITAMIN CAPSULE    Take 1 capsule by mouth once daily.    ROSUVASTATIN (CRESTOR) 5 MG TABLET    TAKE 1 TABLET ONE TIME DAILY    SILDENAFIL (VIAGRA) 50 MG TABLET    Take 1 tablet (50 mg total) by mouth daily as needed for Erectile Dysfunction.    VITAMIN D (VITAMIN D3) 1000 UNITS TAB    Take 1,000 Units by mouth once daily.    ZINC GLUCONATE 50 MG TABLET    Take 50 mg by mouth once daily.   Discontinued Medications    DILTIAZEM (CARDIZEM) 60 MG TABLET    Take 1 tablet (60 mg total) by mouth every 8 (eight) hours.    RIVAROXABAN (XARELTO) 20 MG TAB    Take 20 mg by mouth daily with dinner or evening meal.        Follow up if symptoms worsen or fail to improve.    Chemistry:  Lab Results   Component Value Date     11/05/2024    K 4.4 11/05/2024    BUN 10.9 11/05/2024    CREATININE 0.85 11/05/2024    EGFRNORACEVR >60 11/05/2024    CALCIUM 10.0 11/05/2024    ALKPHOS 50 10/01/2024    ALBUMIN 3.7 10/01/2024    BILIDIR 0.3 05/02/2022    IBILI 0.40 05/02/2022     AST 21 10/01/2024    ALT 28 10/01/2024    MG 1.80 11/05/2024        Lab Results   Component Value Date    HGBA1C 5.9 10/01/2024        Hematology:  Lab Results   Component Value Date    WBC 8.23 10/01/2024    HGB 14.7 10/01/2024    HCT 43.3 10/01/2024     10/01/2024       Lipid Panel:  Lab Results   Component Value Date    CHOL 163 10/01/2024    HDL 51 10/01/2024    LDL 91.00 10/01/2024    TRIG 105 10/01/2024    TOTALCHOLEST 3 10/01/2024          Future Appointments   Date Time Provider Department Center   10/9/2025 11:00 AM Omar Donahue MD [facility]

## 2025-06-23 NOTE — ASSESSMENT & PLAN NOTE
Stable on current dosages of metoprolol 25mg BID and Cardizem 180mg daily  Continue current management per CV  Low salt diet  Monitor weight

## 2025-06-23 NOTE — PROGRESS NOTES
"Subjective:      Patient ID: Adam Jean Baptiste is a 74 y.o. male.    Chief Complaint: Chest Congestion and Cough      HPI:    Review of patient's allergies indicates:  No Known Allergies    Review of Systems    Objective:   Visit Vitals  Ht 6' 4" (1.93 m)   Wt 120.7 kg (266 lb 1.5 oz)   BMI 32.39 kg/m²     The patient's weight trend is below:   Wt Readings from Last 4 Encounters:   06/23/25 120.7 kg (266 lb 1.5 oz)   01/02/25 120.7 kg (266 lb 3.2 oz)   10/07/24 127 kg (280 lb)   09/06/24 120.1 kg (264 lb 12.4 oz)        Physical Exam    Assessment/Plan:   {There are no diagnoses linked to this encounter. (Refresh or delete this SmartLink)}     Medication List with Changes/Refills   Current Medications    ALLOPURINOL (ZYLOPRIM) 300 MG TABLET    TAKE 1 TABLET ONE TIME DAILY    CLONIDINE (CATAPRES) 0.1 MG TABLET    Take 0.1 mg by mouth.    CO-ENZYME Q-10 30 MG CAPSULE    Take 30 mg by mouth 3 (three) times daily.    DILTIAZEM (CARDIZEM CD) 180 MG 24 HR CAPSULE    Take 180 mg by mouth once daily.    DILTIAZEM (CARDIZEM) 60 MG TABLET    Take 1 tablet (60 mg total) by mouth every 8 (eight) hours.    GUAIFENESIN/DEXTROMETHORPHAN (CORICIDIN HBP CHEST COREY-COUGH ORAL)    Take by mouth.    JARDIANCE 10 MG TABLET        METOPROLOL SUCCINATE (TOPROL-XL) 25 MG 24 HR TABLET    Take 25 mg by mouth 2 (two) times daily.    MULTIVITAMIN CAPSULE    Take 1 capsule by mouth once daily.    RIVAROXABAN (XARELTO) 20 MG TAB    Take 20 mg by mouth daily with dinner or evening meal.    ROSUVASTATIN (CRESTOR) 5 MG TABLET    TAKE 1 TABLET ONE TIME DAILY    SILDENAFIL (VIAGRA) 50 MG TABLET    Take 1 tablet (50 mg total) by mouth daily as needed for Erectile Dysfunction.    ZINC GLUCONATE 50 MG TABLET    Take 50 mg by mouth once daily.        No follow-ups on file.    Chemistry:  Lab Results   Component Value Date     11/05/2024    K 4.4 11/05/2024    BUN 10.9 11/05/2024    CREATININE 0.85 11/05/2024    EGFRNORACEVR >60 11/05/2024    " "CALCIUM 10.0 11/05/2024    ALKPHOS 50 10/01/2024    ALBUMIN 3.7 10/01/2024    BILIDIR 0.3 05/02/2022    IBILI 0.40 05/02/2022    AST 21 10/01/2024    ALT 28 10/01/2024    MG 1.80 11/05/2024        Lab Results   Component Value Date    HGBA1C 5.9 10/01/2024        Hematology:  Lab Results   Component Value Date    WBC 8.23 10/01/2024    HGB 14.7 10/01/2024    HCT 43.3 10/01/2024     10/01/2024       Lipid Panel:  Lab Results   Component Value Date    CHOL 163 10/01/2024    HDL 51 10/01/2024    LDL 91.00 10/01/2024    TRIG 105 10/01/2024    TOTALCHOLEST 3 10/01/2024        Urine:  No results found for: "COLORUA", "APPEARANCEUA", "SGUA", "PHUA", "PROTEINUA", "GLUCOSEUA", "KETONESUA", "BLOODUA", "NITRITESUA", "LEUKOCYTESUR", "RBCUA", "WBCUA", "BACTERIA", "SQEPUA", "HYALINECASTS", "CREATRANDUR", "PROTEINURINE", "UPROTCREA"     Future Appointments   Date Time Provider Department Center   10/9/2025 11:00 AM Omar Donahue MD [id number]     "

## 2025-06-24 ENCOUNTER — TELEPHONE (OUTPATIENT)
Dept: INTERNAL MEDICINE | Facility: CLINIC | Age: 75
End: 2025-06-24
Payer: MEDICARE

## 2025-06-24 NOTE — TELEPHONE ENCOUNTER
Medication  rosuvastatin (CRESTOR) 5 MG tablet [10062]  Outpatient Medication Detail     Disp Refills Start End GUERITA   rosuvastatin (CRESTOR) 5 MG tablet 90 tablet 3 10/31/2024 -- No   Sig - Route: TAKE 1 TABLET ONE TIME DAILY - Oral   Sent to pharmacy as: rosuvastatin (CRESTOR) 5 MG tablet   Class: Normal   Order: [id number]   Date/Time Signed: 10/31/2024 10:35       E-Prescribing Status: Receipt confirmed by pharmacy (10/31/2024 10:35 AM CDT)     Pharmacy    [facility] - [facility] [address]

## 2025-06-24 NOTE — TELEPHONE ENCOUNTER
Copied from CRM #[id number]. Topic: Medications - Pharmacy  >> Jun 24, 2025 10:30 AM Jennifer wrote:  Who Called: Ashlie in regards to Adam Jean Baptiste    Pharmacy is calling to request assistance with Rx    Pharmacy name and phone number: [facility] - [facility] [address]   Phone: [phone]  or  [phone]  Fax: [phone]        Pharmacy contact: Ashlie   Patient Name: adam  Prescription Name: rosuvastatin (CRESTOR) 5 MG tablet 90 tablet   What do they need to clarify?:directions        Preferred Method of Contact: Phone Call  Patient's Preferred Phone Number on File: [phone]   Best Call Back Number, if different: **Ashlie [phone]**  Additional Information: pharmacy call, please advise, thanks

## 2025-07-02 ENCOUNTER — TELEPHONE (OUTPATIENT)
Dept: PHARMACY | Facility: CLINIC | Age: 75
End: 2025-07-02
Payer: MEDICARE

## 2025-07-02 NOTE — TELEPHONE ENCOUNTER
[facility]Population Health Chart Review & Patient Outreach Details For Medication Adherence Project    Reason for Outreach Encounter: 3rd Party payor non-compliance report ([employer], [employer], [employer], etc)  2.  Patient Outreach Method: Reviewed patient chart   3.   Medication in question:    Diabetes Medications              JARDIANCE 10 mg tablet                  jardiance  last filled  6/7 for 30 day supply      4.  Reviewed and or Updates Made To: Patient Chart  5. Outreach Outcomes and/or actions taken: Patient filled medication and is on track to be adherent  Additional Notes:

## 2025-08-02 ENCOUNTER — TELEPHONE (OUTPATIENT)
Dept: PHARMACY | Facility: CLINIC | Age: 75
End: 2025-08-02
Payer: MEDICARE

## 2025-08-02 NOTE — TELEPHONE ENCOUNTER
[facility] Chart Review & Patient Outreach Details For Medication Adherence Project    Reason for Outreach Encounter: 3rd Party payor non-compliance report ([employer], [employer], [employer], etc)  2.  Patient Outreach Method: Reviewed patient chart   3.   Medication in question:    Diabetes Medications              JARDIANCE 10 mg tablet                  jardiance  last filled  7/19 for 30 day supply      4.  Reviewed and or Updates Made To: Patient Chart  5. Outreach Outcomes and/or actions taken: Patient filled medication and is on track to be adherent  Additional Notes:

## 2025-08-04 ENCOUNTER — TELEPHONE (OUTPATIENT)
Dept: INTERNAL MEDICINE | Facility: CLINIC | Age: 75
End: 2025-08-04
Payer: MEDICARE

## 2025-08-04 NOTE — TELEPHONE ENCOUNTER
Additional Information: pt wants nurse to give him a call regarding getting handicap sticker. His has      Please complete Updated Form

## 2025-08-04 NOTE — TELEPHONE ENCOUNTER
Copied from CRM #[id number]. Topic: General Inquiry - Patient Advice  >> Aug 4, 2025 12:14 PM Jannet wrote:  .Who Called: Adam Jean Baptiste    Caller is requesting assistance/information from provider's office.    Symptoms (please be specific): na   How long has patient had these symptoms:  na  List of preferred pharmacies on file (remove unneeded): [unfilled]  If different, enter pharmacy into here including location and phone number: na      Preferred Method of Contact: Phone Call  Patient's Preferred Phone Number on File: [phone]   Best Call Back Number, if different:  Additional Information: pt wants nurse to give him a call regarding getting handicap sticker. His has